# Patient Record
Sex: FEMALE | Race: WHITE | Employment: OTHER | URBAN - METROPOLITAN AREA
[De-identification: names, ages, dates, MRNs, and addresses within clinical notes are randomized per-mention and may not be internally consistent; named-entity substitution may affect disease eponyms.]

---

## 2017-01-06 ENCOUNTER — APPOINTMENT (OUTPATIENT)
Dept: CT IMAGING | Age: 82
End: 2017-01-06
Payer: MEDICARE

## 2017-01-06 ENCOUNTER — APPOINTMENT (OUTPATIENT)
Dept: GENERAL RADIOLOGY | Age: 82
End: 2017-01-06
Payer: MEDICARE

## 2017-01-06 ENCOUNTER — HOSPITAL ENCOUNTER (EMERGENCY)
Age: 82
Discharge: HOME | End: 2017-01-06
Attending: EMERGENCY MEDICINE
Payer: MEDICARE

## 2017-01-06 VITALS
SYSTOLIC BLOOD PRESSURE: 164 MMHG | RESPIRATION RATE: 16 BRPM | HEART RATE: 104 BPM | WEIGHT: 142 LBS | OXYGEN SATURATION: 99 % | HEIGHT: 61 IN | TEMPERATURE: 97.4 F | DIASTOLIC BLOOD PRESSURE: 82 MMHG | BODY MASS INDEX: 26.81 KG/M2

## 2017-01-06 DIAGNOSIS — S30.0XXA CONTUSION OF PELVIS, INITIAL ENCOUNTER: ICD-10-CM

## 2017-01-06 DIAGNOSIS — K59.09 OTHER CONSTIPATION: Primary | ICD-10-CM

## 2017-01-06 DIAGNOSIS — N30.00 ACUTE CYSTITIS WITHOUT HEMATURIA: ICD-10-CM

## 2017-01-06 LAB
ALBUMIN SERPL-MCNC: 4.2 G/DL (ref 3.9–4.9)
ALP BLD-CCNC: 106 U/L (ref 40–130)
ALT SERPL-CCNC: 12 U/L (ref 0–33)
ANION GAP SERPL CALCULATED.3IONS-SCNC: 15 MEQ/L (ref 7–13)
AST SERPL-CCNC: 18 U/L (ref 0–35)
BACTERIA: ABNORMAL /HPF
BILIRUB SERPL-MCNC: 0.2 MG/DL (ref 0–1.2)
BILIRUBIN URINE: NEGATIVE
BLOOD, URINE: NEGATIVE
BUN BLDV-MCNC: 37 MG/DL (ref 8–23)
CALCIUM SERPL-MCNC: 8.9 MG/DL (ref 8.6–10.2)
CHLORIDE BLD-SCNC: 106 MEQ/L (ref 98–107)
CLARITY: ABNORMAL
CO2: 18 MEQ/L (ref 22–29)
COLOR: YELLOW
CREAT SERPL-MCNC: 1.13 MG/DL (ref 0.5–0.9)
CRYSTALS, UA: ABNORMAL
EKG ATRIAL RATE: 107 BPM
EKG Q-T INTERVAL: 368 MS
EKG QRS DURATION: 114 MS
EKG QTC CALCULATION (BAZETT): 486 MS
EKG R AXIS: -25 DEGREES
EKG T AXIS: 0 DEGREES
EKG VENTRICULAR RATE: 105 BPM
EPITHELIAL CELLS, UA: ABNORMAL /HPF
GFR AFRICAN AMERICAN: 54
GFR NON-AFRICAN AMERICAN: 44.6
GLOBULIN: 2.6 G/DL (ref 2.3–3.5)
GLUCOSE BLD-MCNC: 125 MG/DL (ref 74–109)
GLUCOSE URINE: NEGATIVE MG/DL
HCT VFR BLD CALC: 38 % (ref 37–47)
HEMOGLOBIN: 12.4 G/DL (ref 12–16)
KETONES, URINE: NEGATIVE MG/DL
LACTIC ACID: 2.1 MMOL/L (ref 0.5–2.2)
LEUKOCYTE ESTERASE, URINE: ABNORMAL
LIPASE: 49 U/L (ref 13–60)
MCH RBC QN AUTO: 30.5 PG (ref 27–31.3)
MCHC RBC AUTO-ENTMCNC: 32.7 % (ref 33–37)
MCV RBC AUTO: 93.4 FL (ref 82–100)
NITRITE, URINE: POSITIVE
PDW BLD-RTO: 15 % (ref 11.5–14.5)
PH UA: 5.5 (ref 5–9)
PLATELET # BLD: 146 K/UL (ref 130–400)
POTASSIUM SERPL-SCNC: 4.7 MEQ/L (ref 3.5–5.1)
PROTEIN UA: 30 MG/DL
RBC # BLD: 4.07 M/UL (ref 4.2–5.4)
RBC UA: ABNORMAL /HPF (ref 0–2)
SODIUM BLD-SCNC: 139 MEQ/L (ref 132–144)
SPECIFIC GRAVITY UA: 1.02 (ref 1–1.03)
TOTAL PROTEIN: 6.8 G/DL (ref 6.4–8.1)
URINE REFLEX TO CULTURE: YES
UROBILINOGEN, URINE: 0.2 E.U./DL
WBC # BLD: 9 K/UL (ref 4.8–10.8)
WBC UA: ABNORMAL /HPF (ref 0–5)

## 2017-01-06 PROCEDURE — 87077 CULTURE AEROBIC IDENTIFY: CPT

## 2017-01-06 PROCEDURE — 72170 X-RAY EXAM OF PELVIS: CPT

## 2017-01-06 PROCEDURE — 36415 COLL VENOUS BLD VENIPUNCTURE: CPT

## 2017-01-06 PROCEDURE — 85027 COMPLETE CBC AUTOMATED: CPT

## 2017-01-06 PROCEDURE — 99285 EMERGENCY DEPT VISIT HI MDM: CPT

## 2017-01-06 PROCEDURE — 96365 THER/PROPH/DIAG IV INF INIT: CPT

## 2017-01-06 PROCEDURE — 74176 CT ABD & PELVIS W/O CONTRAST: CPT

## 2017-01-06 PROCEDURE — 81001 URINALYSIS AUTO W/SCOPE: CPT

## 2017-01-06 PROCEDURE — 93005 ELECTROCARDIOGRAM TRACING: CPT

## 2017-01-06 PROCEDURE — 80053 COMPREHEN METABOLIC PANEL: CPT

## 2017-01-06 PROCEDURE — 2580000003 HC RX 258: Performed by: EMERGENCY MEDICINE

## 2017-01-06 PROCEDURE — 83605 ASSAY OF LACTIC ACID: CPT

## 2017-01-06 PROCEDURE — 87086 URINE CULTURE/COLONY COUNT: CPT

## 2017-01-06 PROCEDURE — 96375 TX/PRO/DX INJ NEW DRUG ADDON: CPT

## 2017-01-06 PROCEDURE — 71010 XR CHEST PORTABLE: CPT

## 2017-01-06 PROCEDURE — 83690 ASSAY OF LIPASE: CPT

## 2017-01-06 PROCEDURE — 6370000000 HC RX 637 (ALT 250 FOR IP): Performed by: EMERGENCY MEDICINE

## 2017-01-06 PROCEDURE — 6360000002 HC RX W HCPCS: Performed by: EMERGENCY MEDICINE

## 2017-01-06 PROCEDURE — 87186 SC STD MICRODIL/AGAR DIL: CPT

## 2017-01-06 RX ORDER — 0.9 % SODIUM CHLORIDE 0.9 %
500 INTRAVENOUS SOLUTION INTRAVENOUS ONCE
Status: COMPLETED | OUTPATIENT
Start: 2017-01-06 | End: 2017-01-06

## 2017-01-06 RX ORDER — ONDANSETRON 2 MG/ML
4 INJECTION INTRAMUSCULAR; INTRAVENOUS ONCE
Status: COMPLETED | OUTPATIENT
Start: 2017-01-06 | End: 2017-01-06

## 2017-01-06 RX ORDER — CIPROFLOXACIN 500 MG/1
500 TABLET, FILM COATED ORAL 2 TIMES DAILY
Qty: 10 TABLET | Refills: 0 | Status: SHIPPED | OUTPATIENT
Start: 2017-01-06 | End: 2017-01-11

## 2017-01-06 RX ORDER — SODIUM CHLORIDE 0.9 % (FLUSH) 0.9 %
3 SYRINGE (ML) INJECTION EVERY 8 HOURS
Status: DISCONTINUED | OUTPATIENT
Start: 2017-01-06 | End: 2017-01-06 | Stop reason: HOSPADM

## 2017-01-06 RX ADMIN — HYDROMORPHONE HYDROCHLORIDE 0.5 MG: 1 INJECTION, SOLUTION INTRAMUSCULAR; INTRAVENOUS; SUBCUTANEOUS at 03:42

## 2017-01-06 RX ADMIN — MAGESIUM CITRATE 148 ML: 1.75 LIQUID ORAL at 01:48

## 2017-01-06 RX ADMIN — CEFTRIAXONE SODIUM 1 G: 1 INJECTION, POWDER, FOR SOLUTION INTRAMUSCULAR; INTRAVENOUS at 03:47

## 2017-01-06 RX ADMIN — ONDANSETRON 4 MG: 2 INJECTION INTRAMUSCULAR; INTRAVENOUS at 03:42

## 2017-01-06 RX ADMIN — SODIUM CHLORIDE 500 ML: 9 INJECTION, SOLUTION INTRAVENOUS at 03:45

## 2017-01-06 ASSESSMENT — PAIN DESCRIPTION - PAIN TYPE: TYPE: ACUTE PAIN

## 2017-01-06 ASSESSMENT — PAIN DESCRIPTION - LOCATION: LOCATION: ABDOMEN

## 2017-01-06 ASSESSMENT — PAIN SCALES - GENERAL
PAINLEVEL_OUTOF10: 10
PAINLEVEL_OUTOF10: 4
PAINLEVEL_OUTOF10: 4

## 2017-01-08 LAB
ORGANISM: ABNORMAL
URINE CULTURE, ROUTINE: ABNORMAL

## 2017-02-01 ENCOUNTER — OFFICE VISIT (OUTPATIENT)
Dept: INTERNAL MEDICINE | Age: 82
End: 2017-02-01

## 2017-02-01 VITALS
DIASTOLIC BLOOD PRESSURE: 82 MMHG | HEIGHT: 61 IN | BODY MASS INDEX: 27.75 KG/M2 | WEIGHT: 147 LBS | RESPIRATION RATE: 16 BRPM | SYSTOLIC BLOOD PRESSURE: 137 MMHG | OXYGEN SATURATION: 98 % | HEART RATE: 88 BPM | TEMPERATURE: 98.1 F

## 2017-02-01 DIAGNOSIS — R54 FRAIL ELDERLY: ICD-10-CM

## 2017-02-01 DIAGNOSIS — I51.7 MILD CARDIOMEGALY: ICD-10-CM

## 2017-02-01 DIAGNOSIS — E03.9 PRIMARY HYPOTHYROIDISM: ICD-10-CM

## 2017-02-01 DIAGNOSIS — Z87.440 HISTORY OF UTI: ICD-10-CM

## 2017-02-01 DIAGNOSIS — R52 BODY ACHES: ICD-10-CM

## 2017-02-01 DIAGNOSIS — R41.0 INTERMITTENT CONFUSION: ICD-10-CM

## 2017-02-01 DIAGNOSIS — F41.0 PANIC ATTACKS: ICD-10-CM

## 2017-02-01 DIAGNOSIS — I10 ESSENTIAL HYPERTENSION, BENIGN: ICD-10-CM

## 2017-02-01 DIAGNOSIS — F41.9 ANXIETY: ICD-10-CM

## 2017-02-01 DIAGNOSIS — F41.9 ANXIETY: Primary | ICD-10-CM

## 2017-02-01 DIAGNOSIS — J90 PLEURAL EFFUSION, LEFT: ICD-10-CM

## 2017-02-01 LAB
ALBUMIN SERPL-MCNC: 3.7 G/DL (ref 3.9–4.9)
ALP BLD-CCNC: 111 U/L (ref 40–130)
ALT SERPL-CCNC: 18 U/L (ref 0–33)
ANION GAP SERPL CALCULATED.3IONS-SCNC: 14 MEQ/L (ref 7–13)
AST SERPL-CCNC: 20 U/L (ref 0–35)
BILIRUB SERPL-MCNC: 0.2 MG/DL (ref 0–1.2)
BUN BLDV-MCNC: 28 MG/DL (ref 8–23)
CALCIUM SERPL-MCNC: 9 MG/DL (ref 8.6–10.2)
CHLORIDE BLD-SCNC: 104 MEQ/L (ref 98–107)
CO2: 23 MEQ/L (ref 22–29)
CREAT SERPL-MCNC: 1.17 MG/DL (ref 0.5–0.9)
GFR AFRICAN AMERICAN: 51.9
GFR NON-AFRICAN AMERICAN: 42.9
GLOBULIN: 2.4 G/DL (ref 2.3–3.5)
GLUCOSE BLD-MCNC: 98 MG/DL (ref 74–109)
HCT VFR BLD CALC: 38.6 % (ref 37–47)
HEMOGLOBIN: 12.3 G/DL (ref 12–16)
MCH RBC QN AUTO: 29.8 PG (ref 27–31.3)
MCHC RBC AUTO-ENTMCNC: 31.8 % (ref 33–37)
MCV RBC AUTO: 93.6 FL (ref 82–100)
PDW BLD-RTO: 15.2 % (ref 11.5–14.5)
PLATELET # BLD: 195 K/UL (ref 130–400)
POTASSIUM SERPL-SCNC: 4.3 MEQ/L (ref 3.5–5.1)
RBC # BLD: 4.13 M/UL (ref 4.2–5.4)
SODIUM BLD-SCNC: 141 MEQ/L (ref 132–144)
TOTAL PROTEIN: 6.1 G/DL (ref 6.4–8.1)
TSH SERPL DL<=0.05 MIU/L-ACNC: 4.91 UIU/ML (ref 0.27–4.2)
WBC # BLD: 4.8 K/UL (ref 4.8–10.8)

## 2017-02-01 PROCEDURE — 99214 OFFICE O/P EST MOD 30 MIN: CPT | Performed by: INTERNAL MEDICINE

## 2017-02-01 RX ORDER — PAROXETINE 10 MG/1
10 TABLET, FILM COATED ORAL DAILY
Qty: 30 TABLET | Refills: 0 | Status: SHIPPED | OUTPATIENT
Start: 2017-02-01 | End: 2017-02-21 | Stop reason: SDUPTHER

## 2017-02-02 DIAGNOSIS — Z87.440 HISTORY OF UTI: ICD-10-CM

## 2017-02-02 LAB
BACTERIA: NORMAL /HPF
BILIRUBIN URINE: NEGATIVE
BLOOD, URINE: NEGATIVE
CLARITY: ABNORMAL
COLOR: YELLOW
CRYSTALS, UA: NORMAL
EPITHELIAL CELLS, UA: NORMAL /HPF
GLUCOSE URINE: NEGATIVE MG/DL
KETONES, URINE: NEGATIVE MG/DL
LEUKOCYTE ESTERASE, URINE: ABNORMAL
NITRITE, URINE: NEGATIVE
PH UA: 7 (ref 5–9)
PROTEIN UA: NEGATIVE MG/DL
RBC UA: NORMAL /HPF (ref 0–2)
SPECIFIC GRAVITY UA: 1.02 (ref 1–1.03)
URINE REFLEX TO CULTURE: YES
UROBILINOGEN, URINE: 0.2 E.U./DL
WBC UA: NORMAL /HPF (ref 0–5)

## 2017-02-04 LAB
ORGANISM: ABNORMAL
URINE CULTURE, ROUTINE: ABNORMAL

## 2017-02-13 RX ORDER — LEVOTHYROXINE SODIUM 0.15 MG/1
TABLET ORAL
Qty: 30 TABLET | Refills: 0 | Status: SHIPPED | OUTPATIENT
Start: 2017-02-13 | End: 2017-03-15 | Stop reason: SDUPTHER

## 2017-02-14 ENCOUNTER — OFFICE VISIT (OUTPATIENT)
Dept: INTERNAL MEDICINE | Age: 82
End: 2017-02-14

## 2017-02-14 VITALS
DIASTOLIC BLOOD PRESSURE: 75 MMHG | BODY MASS INDEX: 26.24 KG/M2 | SYSTOLIC BLOOD PRESSURE: 116 MMHG | OXYGEN SATURATION: 97 % | HEART RATE: 87 BPM | RESPIRATION RATE: 16 BRPM | TEMPERATURE: 99 F | WEIGHT: 139 LBS | HEIGHT: 61 IN

## 2017-02-14 DIAGNOSIS — N18.30 CKD (CHRONIC KIDNEY DISEASE) STAGE 3, GFR 30-59 ML/MIN (HCC): ICD-10-CM

## 2017-02-14 DIAGNOSIS — F41.9 ANXIETY: Primary | ICD-10-CM

## 2017-02-14 DIAGNOSIS — E88.09 PROTEINS SERUM PLASMA LOW: ICD-10-CM

## 2017-02-14 DIAGNOSIS — E03.9 PRIMARY HYPOTHYROIDISM: ICD-10-CM

## 2017-02-14 DIAGNOSIS — I10 ESSENTIAL HYPERTENSION, BENIGN: ICD-10-CM

## 2017-02-14 PROCEDURE — 99213 OFFICE O/P EST LOW 20 MIN: CPT | Performed by: INTERNAL MEDICINE

## 2017-02-14 RX ORDER — TORSEMIDE 5 MG/1
5 TABLET ORAL DAILY
Qty: 30 TABLET | Refills: 1 | Status: CANCELLED | OUTPATIENT
Start: 2017-02-14

## 2017-02-14 RX ORDER — DILTIAZEM HYDROCHLORIDE 240 MG/1
240 CAPSULE, COATED, EXTENDED RELEASE ORAL 2 TIMES DAILY
Qty: 60 CAPSULE | Refills: 1 | Status: SHIPPED | OUTPATIENT
Start: 2017-02-14 | End: 2017-08-07 | Stop reason: SDUPTHER

## 2017-02-14 RX ORDER — HYDRALAZINE HYDROCHLORIDE 25 MG/1
25 TABLET, FILM COATED ORAL 2 TIMES DAILY
Qty: 60 TABLET | Refills: 1 | Status: ON HOLD | OUTPATIENT
Start: 2017-02-14 | End: 2017-08-04 | Stop reason: HOSPADM

## 2017-02-14 RX ORDER — LOSARTAN POTASSIUM 100 MG/1
TABLET ORAL
Qty: 30 TABLET | Refills: 1 | Status: ON HOLD | OUTPATIENT
Start: 2017-02-14 | End: 2017-08-04 | Stop reason: HOSPADM

## 2017-02-17 ASSESSMENT — ENCOUNTER SYMPTOMS
VOICE CHANGE: 0
APNEA: 0
ABDOMINAL PAIN: 0
BLOOD IN STOOL: 0
VOMITING: 0
CHOKING: 0
BACK PAIN: 0
SHORTNESS OF BREATH: 0
DIARRHEA: 0
CONSTIPATION: 0
PHOTOPHOBIA: 0
COLOR CHANGE: 0
ABDOMINAL DISTENTION: 0
SORE THROAT: 0
CHEST TIGHTNESS: 0
EYE PAIN: 0
COUGH: 0
WHEEZING: 0
NAUSEA: 0
TROUBLE SWALLOWING: 0

## 2017-02-21 DIAGNOSIS — F41.9 ANXIETY: ICD-10-CM

## 2017-02-21 DIAGNOSIS — F41.0 PANIC ATTACKS: ICD-10-CM

## 2017-02-21 RX ORDER — PAROXETINE 10 MG/1
TABLET, FILM COATED ORAL
Qty: 45 TABLET | Refills: 0 | Status: SHIPPED | OUTPATIENT
Start: 2017-02-21 | End: 2017-03-24 | Stop reason: SDUPTHER

## 2017-02-27 ASSESSMENT — ENCOUNTER SYMPTOMS
BACK PAIN: 0
DIARRHEA: 0
NAUSEA: 0
COUGH: 0
SHORTNESS OF BREATH: 0
VOMITING: 0
WHEEZING: 0
CONSTIPATION: 0
ABDOMINAL DISTENTION: 0

## 2017-03-15 RX ORDER — LEVOTHYROXINE SODIUM 0.15 MG/1
TABLET ORAL
Qty: 30 TABLET | Refills: 0 | Status: SHIPPED | OUTPATIENT
Start: 2017-03-15 | End: 2017-08-07 | Stop reason: SDUPTHER

## 2017-03-24 DIAGNOSIS — F41.9 ANXIETY: ICD-10-CM

## 2017-03-24 DIAGNOSIS — F41.0 PANIC ATTACKS: ICD-10-CM

## 2017-03-24 RX ORDER — PAROXETINE 10 MG/1
TABLET, FILM COATED ORAL
Qty: 45 TABLET | Refills: 0 | Status: SHIPPED | OUTPATIENT
Start: 2017-03-24 | End: 2017-08-07 | Stop reason: SDUPTHER

## 2017-06-26 ENCOUNTER — TELEPHONE (OUTPATIENT)
Dept: PHARMACY | Facility: CLINIC | Age: 82
End: 2017-06-26

## 2017-08-01 ENCOUNTER — APPOINTMENT (OUTPATIENT)
Dept: INTERVENTIONAL RADIOLOGY/VASCULAR | Age: 82
DRG: 871 | End: 2017-08-01
Payer: MEDICARE

## 2017-08-01 ENCOUNTER — APPOINTMENT (OUTPATIENT)
Dept: GENERAL RADIOLOGY | Age: 82
DRG: 871 | End: 2017-08-01
Payer: MEDICARE

## 2017-08-01 ENCOUNTER — HOSPITAL ENCOUNTER (INPATIENT)
Age: 82
LOS: 4 days | Discharge: NURSING, REHAB OR LONG-TERM CARE | DRG: 871 | End: 2017-08-05
Attending: EMERGENCY MEDICINE | Admitting: FAMILY MEDICINE
Payer: MEDICARE

## 2017-08-01 DIAGNOSIS — R65.20 SEVERE SEPSIS (HCC): ICD-10-CM

## 2017-08-01 DIAGNOSIS — R09.02 HYPOXIA: ICD-10-CM

## 2017-08-01 DIAGNOSIS — J18.9 COMMUNITY ACQUIRED PNEUMONIA: Primary | ICD-10-CM

## 2017-08-01 DIAGNOSIS — A41.9 SEVERE SEPSIS (HCC): ICD-10-CM

## 2017-08-01 LAB
ANION GAP SERPL CALCULATED.3IONS-SCNC: 17 MEQ/L (ref 7–13)
BACTERIA: ABNORMAL /HPF
BASE EXCESS VENOUS: -3 (ref -3–3)
BASOPHILS ABSOLUTE: 0 K/UL (ref 0–0.2)
BASOPHILS RELATIVE PERCENT: 0.3 %
BILIRUBIN URINE: NEGATIVE
BLOOD, URINE: NEGATIVE
BUN BLDV-MCNC: 17 MG/DL (ref 8–23)
CALCIUM SERPL-MCNC: 8.5 MG/DL (ref 8.6–10.2)
CHLORIDE BLD-SCNC: 104 MEQ/L (ref 98–107)
CLARITY: ABNORMAL
CO2: 21 MEQ/L (ref 22–29)
COLOR: YELLOW
CREAT SERPL-MCNC: 0.89 MG/DL (ref 0.5–0.9)
EOSINOPHILS ABSOLUTE: 0.1 K/UL (ref 0–0.7)
EOSINOPHILS RELATIVE PERCENT: 0.8 %
EPITHELIAL CELLS, UA: ABNORMAL /HPF
GFR AFRICAN AMERICAN: >60
GFR NON-AFRICAN AMERICAN: 58.7
GLUCOSE BLD-MCNC: 229 MG/DL (ref 74–109)
GLUCOSE URINE: NEGATIVE MG/DL
HCO3 VENOUS: 21.9 MMOL/L (ref 23–29)
HCT VFR BLD CALC: 36.3 % (ref 37–47)
HEMOGLOBIN: 11.6 G/DL (ref 12–16)
KETONES, URINE: ABNORMAL MG/DL
LACTATE: 3.39 MMOL/L (ref 0.4–2)
LACTIC ACID: 2.9 MMOL/L (ref 0.5–2.2)
LACTIC ACID: 3 MMOL/L (ref 0.5–2.2)
LEUKOCYTE ESTERASE, URINE: ABNORMAL
LYMPHOCYTES ABSOLUTE: 1.1 K/UL (ref 1–4.8)
LYMPHOCYTES RELATIVE PERCENT: 8.5 %
MCH RBC QN AUTO: 28.2 PG (ref 27–31.3)
MCHC RBC AUTO-ENTMCNC: 31.9 % (ref 33–37)
MCV RBC AUTO: 88.5 FL (ref 82–100)
MONOCYTES ABSOLUTE: 0.4 K/UL (ref 0.2–0.8)
MONOCYTES RELATIVE PERCENT: 3.4 %
NEUTROPHILS ABSOLUTE: 11.5 K/UL (ref 1.4–6.5)
NEUTROPHILS RELATIVE PERCENT: 87 %
NITRITE, URINE: NEGATIVE
O2 SAT, VEN: 62 %
PCO2, VEN: 35 MM HG (ref 40–50)
PDW BLD-RTO: 15.3 % (ref 11.5–14.5)
PERFORMED ON: ABNORMAL
PH UA: 5.5 (ref 5–9)
PH VENOUS: 7.41 (ref 7.35–7.45)
PLATELET # BLD: 200 K/UL (ref 130–400)
PO2, VEN: 32 MM HG
POC SAMPLE TYPE: ABNORMAL
POTASSIUM SERPL-SCNC: 3.3 MEQ/L (ref 3.5–5.1)
PROTEIN UA: 30 MG/DL
RBC # BLD: 4.1 M/UL (ref 4.2–5.4)
RBC UA: ABNORMAL /HPF (ref 0–2)
SODIUM BLD-SCNC: 142 MEQ/L (ref 132–144)
SPECIFIC GRAVITY UA: 1.02 (ref 1–1.03)
TCO2 CALC VENOUS: 23 MMOL/L
TROPONIN: 0.05 NG/ML (ref 0–0.01)
URINE REFLEX TO CULTURE: YES
UROBILINOGEN, URINE: 1 E.U./DL
WBC # BLD: 13.2 K/UL (ref 4.8–10.8)
WBC UA: ABNORMAL /HPF (ref 0–5)

## 2017-08-01 PROCEDURE — 2000000000 HC ICU R&B

## 2017-08-01 PROCEDURE — 99291 CRITICAL CARE FIRST HOUR: CPT | Performed by: ANESTHESIOLOGY

## 2017-08-01 PROCEDURE — 2500000003 HC RX 250 WO HCPCS: Performed by: ANESTHESIOLOGY

## 2017-08-01 PROCEDURE — 85025 COMPLETE CBC W/AUTO DIFF WBC: CPT

## 2017-08-01 PROCEDURE — 2580000003 HC RX 258: Performed by: EMERGENCY MEDICINE

## 2017-08-01 PROCEDURE — 93005 ELECTROCARDIOGRAM TRACING: CPT

## 2017-08-01 PROCEDURE — 36600 WITHDRAWAL OF ARTERIAL BLOOD: CPT

## 2017-08-01 PROCEDURE — 83605 ASSAY OF LACTIC ACID: CPT

## 2017-08-01 PROCEDURE — 87077 CULTURE AEROBIC IDENTIFY: CPT

## 2017-08-01 PROCEDURE — 77001 FLUOROGUIDE FOR VEIN DEVICE: CPT | Performed by: RADIOLOGY

## 2017-08-01 PROCEDURE — 81001 URINALYSIS AUTO W/SCOPE: CPT

## 2017-08-01 PROCEDURE — 6360000002 HC RX W HCPCS: Performed by: ANESTHESIOLOGY

## 2017-08-01 PROCEDURE — 36569 INSJ PICC 5 YR+ W/O IMAGING: CPT | Performed by: RADIOLOGY

## 2017-08-01 PROCEDURE — 87186 SC STD MICRODIL/AGAR DIL: CPT

## 2017-08-01 PROCEDURE — 6360000002 HC RX W HCPCS: Performed by: FAMILY MEDICINE

## 2017-08-01 PROCEDURE — 87040 BLOOD CULTURE FOR BACTERIA: CPT

## 2017-08-01 PROCEDURE — C1751 CATH, INF, PER/CENT/MIDLINE: HCPCS

## 2017-08-01 PROCEDURE — 96374 THER/PROPH/DIAG INJ IV PUSH: CPT

## 2017-08-01 PROCEDURE — 6360000002 HC RX W HCPCS: Performed by: EMERGENCY MEDICINE

## 2017-08-01 PROCEDURE — 02HV33Z INSERTION OF INFUSION DEVICE INTO SUPERIOR VENA CAVA, PERCUTANEOUS APPROACH: ICD-10-PCS | Performed by: ANESTHESIOLOGY

## 2017-08-01 PROCEDURE — 87086 URINE CULTURE/COLONY COUNT: CPT

## 2017-08-01 PROCEDURE — 80048 BASIC METABOLIC PNL TOTAL CA: CPT

## 2017-08-01 PROCEDURE — 2580000003 HC RX 258: Performed by: ANESTHESIOLOGY

## 2017-08-01 PROCEDURE — 71010 XR CHEST PORTABLE: CPT

## 2017-08-01 PROCEDURE — 82803 BLOOD GASES ANY COMBINATION: CPT

## 2017-08-01 PROCEDURE — 99285 EMERGENCY DEPT VISIT HI MDM: CPT

## 2017-08-01 PROCEDURE — 76937 US GUIDE VASCULAR ACCESS: CPT | Performed by: RADIOLOGY

## 2017-08-01 PROCEDURE — 84484 ASSAY OF TROPONIN QUANT: CPT

## 2017-08-01 PROCEDURE — 36415 COLL VENOUS BLD VENIPUNCTURE: CPT

## 2017-08-01 RX ORDER — SODIUM CHLORIDE 9 MG/ML
INJECTION, SOLUTION INTRAVENOUS CONTINUOUS
Status: DISCONTINUED | OUTPATIENT
Start: 2017-08-01 | End: 2017-08-03

## 2017-08-01 RX ORDER — SODIUM CHLORIDE 9 MG/ML
INJECTION, SOLUTION INTRAVENOUS
Status: DISCONTINUED
Start: 2017-08-01 | End: 2017-08-02

## 2017-08-01 RX ORDER — ACETAMINOPHEN 325 MG/1
650 TABLET ORAL EVERY 4 HOURS PRN
Status: DISCONTINUED | OUTPATIENT
Start: 2017-08-01 | End: 2017-08-05 | Stop reason: HOSPADM

## 2017-08-01 RX ORDER — SODIUM CHLORIDE 0.9 % (FLUSH) 0.9 %
10 SYRINGE (ML) INJECTION PRN
Status: DISCONTINUED | OUTPATIENT
Start: 2017-08-01 | End: 2017-08-05 | Stop reason: HOSPADM

## 2017-08-01 RX ORDER — SODIUM CHLORIDE 0.9 % (FLUSH) 0.9 %
10 SYRINGE (ML) INJECTION PRN
Status: DISCONTINUED | OUTPATIENT
Start: 2017-08-01 | End: 2017-08-01 | Stop reason: SDUPTHER

## 2017-08-01 RX ORDER — 0.9 % SODIUM CHLORIDE 0.9 %
1000 INTRAVENOUS SOLUTION INTRAVENOUS ONCE
Status: COMPLETED | OUTPATIENT
Start: 2017-08-01 | End: 2017-08-01

## 2017-08-01 RX ORDER — SODIUM CHLORIDE 0.9 % (FLUSH) 0.9 %
10 SYRINGE (ML) INJECTION EVERY 12 HOURS SCHEDULED
Status: DISCONTINUED | OUTPATIENT
Start: 2017-08-01 | End: 2017-08-05 | Stop reason: HOSPADM

## 2017-08-01 RX ORDER — 0.9 % SODIUM CHLORIDE 0.9 %
30 INTRAVENOUS SOLUTION INTRAVENOUS ONCE
Status: COMPLETED | OUTPATIENT
Start: 2017-08-01 | End: 2017-08-01

## 2017-08-01 RX ORDER — SODIUM CHLORIDE 9 MG/ML
INJECTION, SOLUTION INTRAVENOUS CONTINUOUS
Status: DISCONTINUED | OUTPATIENT
Start: 2017-08-01 | End: 2017-08-01

## 2017-08-01 RX ORDER — 0.9 % SODIUM CHLORIDE 0.9 %
500 INTRAVENOUS SOLUTION INTRAVENOUS
Status: ACTIVE | OUTPATIENT
Start: 2017-08-01 | End: 2017-08-01

## 2017-08-01 RX ORDER — SODIUM CHLORIDE 0.9 % (FLUSH) 0.9 %
10 SYRINGE (ML) INJECTION EVERY 12 HOURS SCHEDULED
Status: DISCONTINUED | OUTPATIENT
Start: 2017-08-01 | End: 2017-08-01 | Stop reason: SDUPTHER

## 2017-08-01 RX ORDER — PANTOPRAZOLE SODIUM 40 MG/1
40 TABLET, DELAYED RELEASE ORAL
Status: DISCONTINUED | OUTPATIENT
Start: 2017-08-02 | End: 2017-08-05 | Stop reason: HOSPADM

## 2017-08-01 RX ORDER — ONDANSETRON 2 MG/ML
4 INJECTION INTRAMUSCULAR; INTRAVENOUS ONCE
Status: COMPLETED | OUTPATIENT
Start: 2017-08-01 | End: 2017-08-01

## 2017-08-01 RX ORDER — LEVOTHYROXINE SODIUM 0.15 MG/1
150 TABLET ORAL DAILY
Status: DISCONTINUED | OUTPATIENT
Start: 2017-08-01 | End: 2017-08-05 | Stop reason: HOSPADM

## 2017-08-01 RX ORDER — ONDANSETRON 2 MG/ML
4 INJECTION INTRAMUSCULAR; INTRAVENOUS EVERY 6 HOURS PRN
Status: DISCONTINUED | OUTPATIENT
Start: 2017-08-01 | End: 2017-08-05 | Stop reason: HOSPADM

## 2017-08-01 RX ORDER — LIDOCAINE HYDROCHLORIDE 20 MG/ML
5 INJECTION, SOLUTION INFILTRATION; PERINEURAL ONCE
Status: COMPLETED | OUTPATIENT
Start: 2017-08-01 | End: 2017-08-01

## 2017-08-01 RX ORDER — ONDANSETRON 2 MG/ML
4 INJECTION INTRAMUSCULAR; INTRAVENOUS EVERY 6 HOURS PRN
Status: DISCONTINUED | OUTPATIENT
Start: 2017-08-01 | End: 2017-08-01

## 2017-08-01 RX ORDER — SODIUM CHLORIDE 9 MG/ML
250 INJECTION, SOLUTION INTRAVENOUS ONCE
Status: COMPLETED | OUTPATIENT
Start: 2017-08-01 | End: 2017-08-01

## 2017-08-01 RX ADMIN — PIPERACILLIN SODIUM,TAZOBACTAM SODIUM 3.38 G: 3; .375 INJECTION, POWDER, FOR SOLUTION INTRAVENOUS at 17:44

## 2017-08-01 RX ADMIN — CEFTRIAXONE SODIUM 1 G: 1 INJECTION, POWDER, FOR SOLUTION INTRAMUSCULAR; INTRAVENOUS at 11:51

## 2017-08-01 RX ADMIN — AZITHROMYCIN MONOHYDRATE 500 MG: 500 INJECTION, POWDER, LYOPHILIZED, FOR SOLUTION INTRAVENOUS at 12:20

## 2017-08-01 RX ADMIN — ONDANSETRON 4 MG: 2 INJECTION INTRAMUSCULAR; INTRAVENOUS at 11:10

## 2017-08-01 RX ADMIN — SODIUM CHLORIDE 250 ML: 9 INJECTION, SOLUTION INTRAVENOUS at 16:52

## 2017-08-01 RX ADMIN — SODIUM CHLORIDE 1000 ML: 9 INJECTION, SOLUTION INTRAVENOUS at 13:59

## 2017-08-01 RX ADMIN — SODIUM CHLORIDE 1893 ML: 9 INJECTION, SOLUTION INTRAVENOUS at 11:58

## 2017-08-01 RX ADMIN — HYDROCORTISONE SODIUM SUCCINATE 50 MG: 100 INJECTION, POWDER, FOR SOLUTION INTRAMUSCULAR; INTRAVENOUS at 15:00

## 2017-08-01 RX ADMIN — Medication 10 ML: at 10:30

## 2017-08-01 RX ADMIN — Medication 5 MCG/MIN: at 15:42

## 2017-08-01 RX ADMIN — SODIUM CHLORIDE, PRESERVATIVE FREE 10 ML: 5 INJECTION INTRAVENOUS at 22:40

## 2017-08-01 RX ADMIN — SODIUM CHLORIDE, PRESERVATIVE FREE 10 ML: 5 INJECTION INTRAVENOUS at 22:39

## 2017-08-01 RX ADMIN — ENOXAPARIN SODIUM 40 MG: 40 INJECTION SUBCUTANEOUS at 17:44

## 2017-08-01 RX ADMIN — LIDOCAINE HYDROCHLORIDE 5 ML: 20 INJECTION, SOLUTION INFILTRATION; PERINEURAL at 16:30

## 2017-08-01 ASSESSMENT — PAIN SCALES - GENERAL
PAINLEVEL_OUTOF10: 0

## 2017-08-02 LAB
ALBUMIN SERPL-MCNC: 2.8 G/DL (ref 3.9–4.9)
ALP BLD-CCNC: 82 U/L (ref 40–130)
ALT SERPL-CCNC: 8 U/L (ref 0–33)
ANION GAP SERPL CALCULATED.3IONS-SCNC: 14 MEQ/L (ref 7–13)
AST SERPL-CCNC: 17 U/L (ref 0–35)
BILIRUB SERPL-MCNC: 0.2 MG/DL (ref 0–1.2)
BUN BLDV-MCNC: 19 MG/DL (ref 8–23)
CALCIUM SERPL-MCNC: 8.1 MG/DL (ref 8.6–10.2)
CHLORIDE BLD-SCNC: 106 MEQ/L (ref 98–107)
CO2: 21 MEQ/L (ref 22–29)
CREAT SERPL-MCNC: 0.91 MG/DL (ref 0.5–0.9)
GFR AFRICAN AMERICAN: >60
GFR NON-AFRICAN AMERICAN: 57.2
GLOBULIN: 2.5 G/DL (ref 2.3–3.5)
GLUCOSE BLD-MCNC: 119 MG/DL (ref 74–109)
HCT VFR BLD CALC: 31.7 % (ref 37–47)
HEMOGLOBIN: 10.1 G/DL (ref 12–16)
LACTIC ACID: 1.4 MMOL/L (ref 0.5–2.2)
MAGNESIUM: 2.1 MG/DL (ref 1.7–2.3)
MCH RBC QN AUTO: 28.6 PG (ref 27–31.3)
MCHC RBC AUTO-ENTMCNC: 31.7 % (ref 33–37)
MCV RBC AUTO: 90 FL (ref 82–100)
PDW BLD-RTO: 15.3 % (ref 11.5–14.5)
PHOSPHORUS: 3.9 MG/DL (ref 2.5–4.5)
PLATELET # BLD: 144 K/UL (ref 130–400)
POTASSIUM SERPL-SCNC: 3.9 MEQ/L (ref 3.5–5.1)
RBC # BLD: 3.52 M/UL (ref 4.2–5.4)
SODIUM BLD-SCNC: 141 MEQ/L (ref 132–144)
TOTAL PROTEIN: 5.3 G/DL (ref 6.4–8.1)
WBC # BLD: 6.3 K/UL (ref 4.8–10.8)

## 2017-08-02 PROCEDURE — 2700000000 HC OXYGEN THERAPY PER DAY

## 2017-08-02 PROCEDURE — 36415 COLL VENOUS BLD VENIPUNCTURE: CPT

## 2017-08-02 PROCEDURE — 99231 SBSQ HOSP IP/OBS SF/LOW 25: CPT | Performed by: ANESTHESIOLOGY

## 2017-08-02 PROCEDURE — G8978 MOBILITY CURRENT STATUS: HCPCS

## 2017-08-02 PROCEDURE — 83735 ASSAY OF MAGNESIUM: CPT

## 2017-08-02 PROCEDURE — 83605 ASSAY OF LACTIC ACID: CPT

## 2017-08-02 PROCEDURE — G8987 SELF CARE CURRENT STATUS: HCPCS

## 2017-08-02 PROCEDURE — 85027 COMPLETE CBC AUTOMATED: CPT

## 2017-08-02 PROCEDURE — 2580000003 HC RX 258: Performed by: ANESTHESIOLOGY

## 2017-08-02 PROCEDURE — 36591 DRAW BLOOD OFF VENOUS DEVICE: CPT | Performed by: NURSE PRACTITIONER

## 2017-08-02 PROCEDURE — G8979 MOBILITY GOAL STATUS: HCPCS

## 2017-08-02 PROCEDURE — 6370000000 HC RX 637 (ALT 250 FOR IP): Performed by: INTERNAL MEDICINE

## 2017-08-02 PROCEDURE — 6370000000 HC RX 637 (ALT 250 FOR IP): Performed by: ANESTHESIOLOGY

## 2017-08-02 PROCEDURE — 80053 COMPREHEN METABOLIC PANEL: CPT

## 2017-08-02 PROCEDURE — 97167 OT EVAL HIGH COMPLEX 60 MIN: CPT

## 2017-08-02 PROCEDURE — 84100 ASSAY OF PHOSPHORUS: CPT

## 2017-08-02 PROCEDURE — G8988 SELF CARE GOAL STATUS: HCPCS

## 2017-08-02 PROCEDURE — 6360000002 HC RX W HCPCS: Performed by: FAMILY MEDICINE

## 2017-08-02 PROCEDURE — 2580000003 HC RX 258: Performed by: FAMILY MEDICINE

## 2017-08-02 PROCEDURE — 6360000002 HC RX W HCPCS: Performed by: ANESTHESIOLOGY

## 2017-08-02 PROCEDURE — 1210000000 HC MED SURG R&B

## 2017-08-02 PROCEDURE — 97162 PT EVAL MOD COMPLEX 30 MIN: CPT

## 2017-08-02 RX ORDER — LOSARTAN POTASSIUM 50 MG/1
100 TABLET ORAL DAILY
Status: DISCONTINUED | OUTPATIENT
Start: 2017-08-03 | End: 2017-08-04

## 2017-08-02 RX ORDER — HYDRALAZINE HYDROCHLORIDE 25 MG/1
25 TABLET, FILM COATED ORAL 2 TIMES DAILY
Status: DISCONTINUED | OUTPATIENT
Start: 2017-08-02 | End: 2017-08-05 | Stop reason: HOSPADM

## 2017-08-02 RX ORDER — PAROXETINE 10 MG/1
15 TABLET, FILM COATED ORAL DAILY
Status: DISCONTINUED | OUTPATIENT
Start: 2017-08-02 | End: 2017-08-05 | Stop reason: HOSPADM

## 2017-08-02 RX ORDER — DILTIAZEM HYDROCHLORIDE 240 MG/1
240 CAPSULE, COATED, EXTENDED RELEASE ORAL 2 TIMES DAILY
Status: DISCONTINUED | OUTPATIENT
Start: 2017-08-02 | End: 2017-08-05 | Stop reason: HOSPADM

## 2017-08-02 RX ADMIN — PIPERACILLIN SODIUM,TAZOBACTAM SODIUM 3.38 G: 3; .375 INJECTION, POWDER, FOR SOLUTION INTRAVENOUS at 08:52

## 2017-08-02 RX ADMIN — SODIUM CHLORIDE, PRESERVATIVE FREE 10 ML: 5 INJECTION INTRAVENOUS at 08:52

## 2017-08-02 RX ADMIN — PANTOPRAZOLE SODIUM 40 MG: 40 TABLET, DELAYED RELEASE ORAL at 06:31

## 2017-08-02 RX ADMIN — HYDROCORTISONE SODIUM SUCCINATE 50 MG: 100 INJECTION, POWDER, FOR SOLUTION INTRAMUSCULAR; INTRAVENOUS at 17:11

## 2017-08-02 RX ADMIN — HYDROCORTISONE SODIUM SUCCINATE 50 MG: 100 INJECTION, POWDER, FOR SOLUTION INTRAMUSCULAR; INTRAVENOUS at 02:34

## 2017-08-02 RX ADMIN — AZITHROMYCIN MONOHYDRATE 500 MG: 500 INJECTION, POWDER, LYOPHILIZED, FOR SOLUTION INTRAVENOUS at 06:30

## 2017-08-02 RX ADMIN — PIPERACILLIN SODIUM,TAZOBACTAM SODIUM 3.38 G: 3; .375 INJECTION, POWDER, FOR SOLUTION INTRAVENOUS at 17:11

## 2017-08-02 RX ADMIN — PIPERACILLIN SODIUM,TAZOBACTAM SODIUM 3.38 G: 3; .375 INJECTION, POWDER, FOR SOLUTION INTRAVENOUS at 02:34

## 2017-08-02 RX ADMIN — HYDROCORTISONE SODIUM SUCCINATE 50 MG: 100 INJECTION, POWDER, FOR SOLUTION INTRAMUSCULAR; INTRAVENOUS at 08:51

## 2017-08-02 RX ADMIN — ENOXAPARIN SODIUM 40 MG: 40 INJECTION SUBCUTANEOUS at 08:51

## 2017-08-02 RX ADMIN — LEVOTHYROXINE SODIUM 150 MCG: 150 TABLET ORAL at 06:31

## 2017-08-02 RX ADMIN — DILTIAZEM HYDROCHLORIDE 240 MG: 240 CAPSULE, COATED, EXTENDED RELEASE ORAL at 23:42

## 2017-08-02 RX ADMIN — HYDRALAZINE HYDROCHLORIDE 25 MG: 25 TABLET, FILM COATED ORAL at 22:49

## 2017-08-02 RX ADMIN — PAROXETINE 15 MG: 10 TABLET, FILM COATED ORAL at 15:36

## 2017-08-02 ASSESSMENT — PAIN SCALES - GENERAL
PAINLEVEL_OUTOF10: 0

## 2017-08-03 ENCOUNTER — APPOINTMENT (OUTPATIENT)
Dept: GENERAL RADIOLOGY | Age: 82
DRG: 871 | End: 2017-08-03
Payer: MEDICARE

## 2017-08-03 LAB
ANION GAP SERPL CALCULATED.3IONS-SCNC: 12 MEQ/L (ref 7–13)
BUN BLDV-MCNC: 16 MG/DL (ref 8–23)
CALCIUM SERPL-MCNC: 7.9 MG/DL (ref 8.6–10.2)
CHLORIDE BLD-SCNC: 110 MEQ/L (ref 98–107)
CO2: 23 MEQ/L (ref 22–29)
CREAT SERPL-MCNC: 0.78 MG/DL (ref 0.5–0.9)
GFR AFRICAN AMERICAN: >60
GFR NON-AFRICAN AMERICAN: >60
GLUCOSE BLD-MCNC: 114 MG/DL (ref 74–109)
HCT VFR BLD CALC: 31.6 % (ref 37–47)
HEMOGLOBIN: 10.2 G/DL (ref 12–16)
MAGNESIUM: 2.1 MG/DL (ref 1.7–2.3)
MCH RBC QN AUTO: 28.5 PG (ref 27–31.3)
MCHC RBC AUTO-ENTMCNC: 32.2 % (ref 33–37)
MCV RBC AUTO: 88.5 FL (ref 82–100)
PDW BLD-RTO: 15.3 % (ref 11.5–14.5)
PLATELET # BLD: 132 K/UL (ref 130–400)
POTASSIUM SERPL-SCNC: 3 MEQ/L (ref 3.5–5.1)
RBC # BLD: 3.57 M/UL (ref 4.2–5.4)
SODIUM BLD-SCNC: 145 MEQ/L (ref 132–144)
WBC # BLD: 7.3 K/UL (ref 4.8–10.8)

## 2017-08-03 PROCEDURE — 2580000003 HC RX 258: Performed by: FAMILY MEDICINE

## 2017-08-03 PROCEDURE — 6370000000 HC RX 637 (ALT 250 FOR IP): Performed by: INTERNAL MEDICINE

## 2017-08-03 PROCEDURE — 83735 ASSAY OF MAGNESIUM: CPT

## 2017-08-03 PROCEDURE — 1210000000 HC MED SURG R&B

## 2017-08-03 PROCEDURE — 6360000002 HC RX W HCPCS: Performed by: ANESTHESIOLOGY

## 2017-08-03 PROCEDURE — 6360000002 HC RX W HCPCS: Performed by: INTERNAL MEDICINE

## 2017-08-03 PROCEDURE — 71020 XR CHEST STANDARD TWO VW: CPT

## 2017-08-03 PROCEDURE — 6370000000 HC RX 637 (ALT 250 FOR IP): Performed by: ANESTHESIOLOGY

## 2017-08-03 PROCEDURE — 2580000003 HC RX 258: Performed by: ANESTHESIOLOGY

## 2017-08-03 PROCEDURE — 6360000002 HC RX W HCPCS: Performed by: FAMILY MEDICINE

## 2017-08-03 PROCEDURE — 80048 BASIC METABOLIC PNL TOTAL CA: CPT

## 2017-08-03 PROCEDURE — 97535 SELF CARE MNGMENT TRAINING: CPT

## 2017-08-03 PROCEDURE — 85027 COMPLETE CBC AUTOMATED: CPT

## 2017-08-03 RX ORDER — POTASSIUM CHLORIDE 29.8 MG/ML
60 INJECTION INTRAVENOUS ONCE
Status: DISCONTINUED | OUTPATIENT
Start: 2017-08-03 | End: 2017-08-03 | Stop reason: RX

## 2017-08-03 RX ORDER — POTASSIUM CHLORIDE 7.45 MG/ML
10 INJECTION INTRAVENOUS
Status: DISPENSED | OUTPATIENT
Start: 2017-08-03 | End: 2017-08-03

## 2017-08-03 RX ORDER — SODIUM CHLORIDE 450 MG/100ML
INJECTION, SOLUTION INTRAVENOUS CONTINUOUS
Status: DISCONTINUED | OUTPATIENT
Start: 2017-08-03 | End: 2017-08-03

## 2017-08-03 RX ADMIN — HYDROCORTISONE SODIUM SUCCINATE 50 MG: 100 INJECTION, POWDER, FOR SOLUTION INTRAMUSCULAR; INTRAVENOUS at 08:21

## 2017-08-03 RX ADMIN — POTASSIUM CHLORIDE: 149 INJECTION, SOLUTION, CONCENTRATE INTRAVENOUS at 14:13

## 2017-08-03 RX ADMIN — POTASSIUM CHLORIDE 10 MEQ: 7.46 INJECTION, SOLUTION INTRAVENOUS at 16:49

## 2017-08-03 RX ADMIN — POTASSIUM CHLORIDE 10 MEQ: 7.46 INJECTION, SOLUTION INTRAVENOUS at 11:43

## 2017-08-03 RX ADMIN — POTASSIUM CHLORIDE 10 MEQ: 7.46 INJECTION, SOLUTION INTRAVENOUS at 18:16

## 2017-08-03 RX ADMIN — PANTOPRAZOLE SODIUM 40 MG: 40 TABLET, DELAYED RELEASE ORAL at 08:20

## 2017-08-03 RX ADMIN — SODIUM CHLORIDE, PRESERVATIVE FREE 10 ML: 5 INJECTION INTRAVENOUS at 09:00

## 2017-08-03 RX ADMIN — DILTIAZEM HYDROCHLORIDE 240 MG: 240 CAPSULE, COATED, EXTENDED RELEASE ORAL at 20:56

## 2017-08-03 RX ADMIN — PIPERACILLIN SODIUM,TAZOBACTAM SODIUM 3.38 G: 3; .375 INJECTION, POWDER, FOR SOLUTION INTRAVENOUS at 17:49

## 2017-08-03 RX ADMIN — SODIUM CHLORIDE, PRESERVATIVE FREE 10 ML: 5 INJECTION INTRAVENOUS at 20:58

## 2017-08-03 RX ADMIN — PIPERACILLIN SODIUM,TAZOBACTAM SODIUM 3.38 G: 3; .375 INJECTION, POWDER, FOR SOLUTION INTRAVENOUS at 10:54

## 2017-08-03 RX ADMIN — LOSARTAN POTASSIUM 100 MG: 50 TABLET, FILM COATED ORAL at 08:20

## 2017-08-03 RX ADMIN — HYDRALAZINE HYDROCHLORIDE 25 MG: 25 TABLET, FILM COATED ORAL at 10:54

## 2017-08-03 RX ADMIN — PAROXETINE 15 MG: 10 TABLET, FILM COATED ORAL at 08:19

## 2017-08-03 RX ADMIN — PIPERACILLIN SODIUM,TAZOBACTAM SODIUM 3.38 G: 3; .375 INJECTION, POWDER, FOR SOLUTION INTRAVENOUS at 02:09

## 2017-08-03 RX ADMIN — ENOXAPARIN SODIUM 40 MG: 40 INJECTION SUBCUTANEOUS at 08:20

## 2017-08-03 RX ADMIN — LEVOTHYROXINE SODIUM 150 MCG: 150 TABLET ORAL at 06:22

## 2017-08-03 RX ADMIN — POTASSIUM CHLORIDE 10 MEQ: 7.46 INJECTION, SOLUTION INTRAVENOUS at 14:13

## 2017-08-03 RX ADMIN — HYDRALAZINE HYDROCHLORIDE 25 MG: 25 TABLET, FILM COATED ORAL at 20:56

## 2017-08-03 RX ADMIN — POTASSIUM CHLORIDE 10 MEQ: 7.46 INJECTION, SOLUTION INTRAVENOUS at 07:48

## 2017-08-03 RX ADMIN — DILTIAZEM HYDROCHLORIDE 240 MG: 240 CAPSULE, COATED, EXTENDED RELEASE ORAL at 10:54

## 2017-08-03 RX ADMIN — AZITHROMYCIN MONOHYDRATE 500 MG: 500 INJECTION, POWDER, LYOPHILIZED, FOR SOLUTION INTRAVENOUS at 08:22

## 2017-08-03 RX ADMIN — HYDROCORTISONE SODIUM SUCCINATE 50 MG: 100 INJECTION, POWDER, FOR SOLUTION INTRAMUSCULAR; INTRAVENOUS at 02:10

## 2017-08-03 ASSESSMENT — PAIN SCALES - GENERAL: PAINLEVEL_OUTOF10: 0

## 2017-08-04 LAB
ANION GAP SERPL CALCULATED.3IONS-SCNC: 10 MEQ/L (ref 7–13)
BUN BLDV-MCNC: 11 MG/DL (ref 8–23)
CALCIUM SERPL-MCNC: 7.1 MG/DL (ref 8.6–10.2)
CHLORIDE BLD-SCNC: 107 MEQ/L (ref 98–107)
CO2: 23 MEQ/L (ref 22–29)
CREAT SERPL-MCNC: 0.77 MG/DL (ref 0.5–0.9)
GFR AFRICAN AMERICAN: >60
GFR NON-AFRICAN AMERICAN: >60
GLUCOSE BLD-MCNC: 138 MG/DL (ref 74–109)
HCT VFR BLD CALC: 29.5 % (ref 37–47)
HEMOGLOBIN: 9.6 G/DL (ref 12–16)
MAGNESIUM: 1.8 MG/DL (ref 1.7–2.3)
MCH RBC QN AUTO: 29.1 PG (ref 27–31.3)
MCHC RBC AUTO-ENTMCNC: 32.4 % (ref 33–37)
MCV RBC AUTO: 89.7 FL (ref 82–100)
ORGANISM: ABNORMAL
PDW BLD-RTO: 15.5 % (ref 11.5–14.5)
PLATELET # BLD: 139 K/UL (ref 130–400)
POTASSIUM SERPL-SCNC: 5.9 MEQ/L (ref 3.5–5.1)
RBC # BLD: 3.29 M/UL (ref 4.2–5.4)
SODIUM BLD-SCNC: 140 MEQ/L (ref 132–144)
URINE CULTURE, ROUTINE: ABNORMAL
WBC # BLD: 7.3 K/UL (ref 4.8–10.8)

## 2017-08-04 PROCEDURE — 83735 ASSAY OF MAGNESIUM: CPT

## 2017-08-04 PROCEDURE — 2580000003 HC RX 258: Performed by: INTERNAL MEDICINE

## 2017-08-04 PROCEDURE — 6370000000 HC RX 637 (ALT 250 FOR IP): Performed by: INTERNAL MEDICINE

## 2017-08-04 PROCEDURE — 80048 BASIC METABOLIC PNL TOTAL CA: CPT

## 2017-08-04 PROCEDURE — 2580000003 HC RX 258: Performed by: FAMILY MEDICINE

## 2017-08-04 PROCEDURE — 85027 COMPLETE CBC AUTOMATED: CPT

## 2017-08-04 PROCEDURE — 97535 SELF CARE MNGMENT TRAINING: CPT

## 2017-08-04 PROCEDURE — 6370000000 HC RX 637 (ALT 250 FOR IP): Performed by: ANESTHESIOLOGY

## 2017-08-04 PROCEDURE — 6370000000 HC RX 637 (ALT 250 FOR IP): Performed by: FAMILY MEDICINE

## 2017-08-04 PROCEDURE — 1210000000 HC MED SURG R&B

## 2017-08-04 PROCEDURE — 97112 NEUROMUSCULAR REEDUCATION: CPT

## 2017-08-04 PROCEDURE — 6360000002 HC RX W HCPCS: Performed by: ANESTHESIOLOGY

## 2017-08-04 PROCEDURE — 2580000003 HC RX 258: Performed by: ANESTHESIOLOGY

## 2017-08-04 PROCEDURE — 6360000002 HC RX W HCPCS: Performed by: FAMILY MEDICINE

## 2017-08-04 RX ORDER — HYDROCHLOROTHIAZIDE 25 MG/1
25 TABLET ORAL DAILY
Qty: 30 TABLET | Refills: 0
Start: 2017-08-04 | End: 2017-08-07 | Stop reason: SDUPTHER

## 2017-08-04 RX ORDER — FUROSEMIDE 10 MG/ML
20 INJECTION INTRAMUSCULAR; INTRAVENOUS ONCE
Status: COMPLETED | OUTPATIENT
Start: 2017-08-04 | End: 2017-08-04

## 2017-08-04 RX ORDER — AMOXICILLIN AND CLAVULANATE POTASSIUM 875; 125 MG/1; MG/1
1 TABLET, FILM COATED ORAL 2 TIMES DAILY
Qty: 20 TABLET | Refills: 0
Start: 2017-08-04 | End: 2017-08-07 | Stop reason: SDUPTHER

## 2017-08-04 RX ORDER — SODIUM CHLORIDE 450 MG/100ML
INJECTION, SOLUTION INTRAVENOUS CONTINUOUS
Status: DISCONTINUED | OUTPATIENT
Start: 2017-08-04 | End: 2017-08-05 | Stop reason: HOSPADM

## 2017-08-04 RX ORDER — HYDROCHLOROTHIAZIDE 25 MG/1
25 TABLET ORAL DAILY
Status: DISCONTINUED | OUTPATIENT
Start: 2017-08-04 | End: 2017-08-05 | Stop reason: HOSPADM

## 2017-08-04 RX ORDER — LANOLIN ALCOHOL/MO/W.PET/CERES
3 CREAM (GRAM) TOPICAL NIGHTLY PRN
Status: DISCONTINUED | OUTPATIENT
Start: 2017-08-04 | End: 2017-08-05 | Stop reason: HOSPADM

## 2017-08-04 RX ORDER — SODIUM POLYSTYRENE SULFONATE 15 G/60ML
15 SUSPENSION ORAL; RECTAL ONCE
Status: COMPLETED | OUTPATIENT
Start: 2017-08-04 | End: 2017-08-04

## 2017-08-04 RX ORDER — HYDRALAZINE HYDROCHLORIDE 25 MG/1
25 TABLET, FILM COATED ORAL 2 TIMES DAILY
Qty: 60 TABLET | Refills: 0
Start: 2017-08-04 | End: 2017-08-07 | Stop reason: SDUPTHER

## 2017-08-04 RX ADMIN — DILTIAZEM HYDROCHLORIDE 240 MG: 240 CAPSULE, COATED, EXTENDED RELEASE ORAL at 10:52

## 2017-08-04 RX ADMIN — PANTOPRAZOLE SODIUM 40 MG: 40 TABLET, DELAYED RELEASE ORAL at 05:09

## 2017-08-04 RX ADMIN — PIPERACILLIN SODIUM,TAZOBACTAM SODIUM 3.38 G: 3; .375 INJECTION, POWDER, FOR SOLUTION INTRAVENOUS at 17:48

## 2017-08-04 RX ADMIN — SODIUM CHLORIDE: 4.5 INJECTION, SOLUTION INTRAVENOUS at 08:22

## 2017-08-04 RX ADMIN — SODIUM CHLORIDE, PRESERVATIVE FREE 10 ML: 5 INJECTION INTRAVENOUS at 21:31

## 2017-08-04 RX ADMIN — SODIUM CHLORIDE, PRESERVATIVE FREE 10 ML: 5 INJECTION INTRAVENOUS at 09:30

## 2017-08-04 RX ADMIN — DILTIAZEM HYDROCHLORIDE 240 MG: 240 CAPSULE, COATED, EXTENDED RELEASE ORAL at 21:30

## 2017-08-04 RX ADMIN — PIPERACILLIN SODIUM,TAZOBACTAM SODIUM 3.38 G: 3; .375 INJECTION, POWDER, FOR SOLUTION INTRAVENOUS at 02:30

## 2017-08-04 RX ADMIN — AZITHROMYCIN MONOHYDRATE 500 MG: 500 INJECTION, POWDER, LYOPHILIZED, FOR SOLUTION INTRAVENOUS at 08:23

## 2017-08-04 RX ADMIN — SODIUM POLYSTYRENE SULFONATE 15 G: 15 SUSPENSION ORAL; RECTAL at 08:27

## 2017-08-04 RX ADMIN — MELATONIN TAB 3 MG 3 MG: 3 TAB at 21:30

## 2017-08-04 RX ADMIN — HYDRALAZINE HYDROCHLORIDE 25 MG: 25 TABLET, FILM COATED ORAL at 21:30

## 2017-08-04 RX ADMIN — PAROXETINE 15 MG: 10 TABLET, FILM COATED ORAL at 08:28

## 2017-08-04 RX ADMIN — ENOXAPARIN SODIUM 40 MG: 40 INJECTION SUBCUTANEOUS at 08:28

## 2017-08-04 RX ADMIN — LOSARTAN POTASSIUM 100 MG: 50 TABLET, FILM COATED ORAL at 08:29

## 2017-08-04 RX ADMIN — HYDROCHLOROTHIAZIDE 25 MG: 25 TABLET ORAL at 12:57

## 2017-08-04 RX ADMIN — PIPERACILLIN SODIUM,TAZOBACTAM SODIUM 3.38 G: 3; .375 INJECTION, POWDER, FOR SOLUTION INTRAVENOUS at 11:33

## 2017-08-04 RX ADMIN — FUROSEMIDE 20 MG: 10 INJECTION, SOLUTION INTRAVENOUS at 12:57

## 2017-08-04 RX ADMIN — LEVOTHYROXINE SODIUM 150 MCG: 150 TABLET ORAL at 05:09

## 2017-08-04 RX ADMIN — HYDRALAZINE HYDROCHLORIDE 25 MG: 25 TABLET, FILM COATED ORAL at 10:52

## 2017-08-05 VITALS
WEIGHT: 146.39 LBS | DIASTOLIC BLOOD PRESSURE: 79 MMHG | SYSTOLIC BLOOD PRESSURE: 160 MMHG | BODY MASS INDEX: 27.64 KG/M2 | OXYGEN SATURATION: 96 % | TEMPERATURE: 97.5 F | RESPIRATION RATE: 18 BRPM | HEART RATE: 71 BPM | HEIGHT: 61 IN

## 2017-08-05 LAB
ANION GAP SERPL CALCULATED.3IONS-SCNC: 12 MEQ/L (ref 7–13)
BUN BLDV-MCNC: 11 MG/DL (ref 8–23)
CALCIUM SERPL-MCNC: 7.1 MG/DL (ref 8.6–10.2)
CHLORIDE BLD-SCNC: 100 MEQ/L (ref 98–107)
CO2: 27 MEQ/L (ref 22–29)
CREAT SERPL-MCNC: 0.85 MG/DL (ref 0.5–0.9)
GFR AFRICAN AMERICAN: >60
GFR NON-AFRICAN AMERICAN: >60
GLUCOSE BLD-MCNC: 88 MG/DL (ref 74–109)
POTASSIUM SERPL-SCNC: 2.3 MEQ/L (ref 3.5–5.1)
POTASSIUM SERPL-SCNC: 2.5 MEQ/L (ref 3.5–5.1)
SODIUM BLD-SCNC: 139 MEQ/L (ref 132–144)

## 2017-08-05 PROCEDURE — 6370000000 HC RX 637 (ALT 250 FOR IP): Performed by: INTERNAL MEDICINE

## 2017-08-05 PROCEDURE — 6360000002 HC RX W HCPCS: Performed by: ANESTHESIOLOGY

## 2017-08-05 PROCEDURE — 6370000000 HC RX 637 (ALT 250 FOR IP): Performed by: FAMILY MEDICINE

## 2017-08-05 PROCEDURE — 2580000003 HC RX 258: Performed by: ANESTHESIOLOGY

## 2017-08-05 PROCEDURE — 84132 ASSAY OF SERUM POTASSIUM: CPT

## 2017-08-05 PROCEDURE — 2580000003 HC RX 258: Performed by: INTERNAL MEDICINE

## 2017-08-05 PROCEDURE — 6360000002 HC RX W HCPCS: Performed by: FAMILY MEDICINE

## 2017-08-05 PROCEDURE — 80048 BASIC METABOLIC PNL TOTAL CA: CPT

## 2017-08-05 PROCEDURE — 6370000000 HC RX 637 (ALT 250 FOR IP): Performed by: ANESTHESIOLOGY

## 2017-08-05 RX ORDER — POTASSIUM CHLORIDE 20MEQ/15ML
40 LIQUID (ML) ORAL 2 TIMES DAILY
Status: DISCONTINUED | OUTPATIENT
Start: 2017-08-05 | End: 2017-08-05 | Stop reason: HOSPADM

## 2017-08-05 RX ADMIN — PAROXETINE 15 MG: 10 TABLET, FILM COATED ORAL at 09:35

## 2017-08-05 RX ADMIN — PIPERACILLIN SODIUM,TAZOBACTAM SODIUM 3.38 G: 3; .375 INJECTION, POWDER, FOR SOLUTION INTRAVENOUS at 01:49

## 2017-08-05 RX ADMIN — HYDRALAZINE HYDROCHLORIDE 25 MG: 25 TABLET, FILM COATED ORAL at 09:36

## 2017-08-05 RX ADMIN — PIPERACILLIN SODIUM,TAZOBACTAM SODIUM 3.38 G: 3; .375 INJECTION, POWDER, FOR SOLUTION INTRAVENOUS at 09:34

## 2017-08-05 RX ADMIN — LEVOTHYROXINE SODIUM 150 MCG: 150 TABLET ORAL at 06:59

## 2017-08-05 RX ADMIN — PANTOPRAZOLE SODIUM 40 MG: 40 TABLET, DELAYED RELEASE ORAL at 06:59

## 2017-08-05 RX ADMIN — ENOXAPARIN SODIUM 40 MG: 40 INJECTION SUBCUTANEOUS at 09:36

## 2017-08-05 RX ADMIN — HYDROCHLOROTHIAZIDE 25 MG: 25 TABLET ORAL at 09:35

## 2017-08-05 RX ADMIN — SODIUM CHLORIDE: 4.5 INJECTION, SOLUTION INTRAVENOUS at 01:49

## 2017-08-05 RX ADMIN — DILTIAZEM HYDROCHLORIDE 240 MG: 240 CAPSULE, COATED, EXTENDED RELEASE ORAL at 09:35

## 2017-08-05 RX ADMIN — POTASSIUM CHLORIDE 40 MEQ: 20 SOLUTION ORAL at 09:36

## 2017-08-05 RX ADMIN — SODIUM CHLORIDE, PRESERVATIVE FREE 10 ML: 5 INJECTION INTRAVENOUS at 09:40

## 2017-08-06 LAB
BLOOD CULTURE, ROUTINE: NORMAL
CULTURE, BLOOD 2: NORMAL

## 2017-08-07 ENCOUNTER — OFFICE VISIT (OUTPATIENT)
Dept: GERIATRIC MEDICINE | Age: 82
End: 2017-08-07

## 2017-08-07 ENCOUNTER — CARE COORDINATION (OUTPATIENT)
Dept: CASE MANAGEMENT | Age: 82
End: 2017-08-07

## 2017-08-07 DIAGNOSIS — E87.6 HYPOKALEMIA: ICD-10-CM

## 2017-08-07 DIAGNOSIS — M15.9 OSTEOARTHRITIS OF MULTIPLE JOINTS, UNSPECIFIED OSTEOARTHRITIS TYPE: ICD-10-CM

## 2017-08-07 DIAGNOSIS — R53.1 WEAKNESS: ICD-10-CM

## 2017-08-07 DIAGNOSIS — J18.9 PNEUMONIA, UNSPECIFIED ORGANISM: Primary | ICD-10-CM

## 2017-08-07 DIAGNOSIS — I10 ESSENTIAL HYPERTENSION: ICD-10-CM

## 2017-08-07 LAB
ALBUMIN SERPL-MCNC: 2.6 G/DL (ref 3.9–4.9)
ALP BLD-CCNC: 78 U/L (ref 40–130)
ALT SERPL-CCNC: 15 U/L (ref 0–33)
ANION GAP SERPL CALCULATED.3IONS-SCNC: 16 MEQ/L (ref 7–13)
AST SERPL-CCNC: 20 U/L (ref 0–35)
BILIRUB SERPL-MCNC: 0.3 MG/DL (ref 0–1.2)
BUN BLDV-MCNC: 18 MG/DL (ref 8–23)
CALCIUM SERPL-MCNC: 7.8 MG/DL (ref 8.6–10.2)
CHLORIDE BLD-SCNC: 102 MEQ/L (ref 98–107)
CO2: 27 MEQ/L (ref 22–29)
CREAT SERPL-MCNC: 1.13 MG/DL (ref 0.5–0.9)
EKG ATRIAL RATE: 107 BPM
EKG P AXIS: -16 DEGREES
EKG P-R INTERVAL: 200 MS
EKG Q-T INTERVAL: 392 MS
EKG QRS DURATION: 156 MS
EKG QTC CALCULATION (BAZETT): 523 MS
EKG R AXIS: -29 DEGREES
EKG T AXIS: -25 DEGREES
EKG VENTRICULAR RATE: 107 BPM
GFR AFRICAN AMERICAN: 53.9
GFR NON-AFRICAN AMERICAN: 44.6
GLOBULIN: 2.4 G/DL (ref 2.3–3.5)
GLUCOSE BLD-MCNC: 76 MG/DL (ref 74–109)
HCT VFR BLD CALC: 33.1 % (ref 37–47)
HEMOGLOBIN: 10.7 G/DL (ref 12–16)
MCH RBC QN AUTO: 28.6 PG (ref 27–31.3)
MCHC RBC AUTO-ENTMCNC: 32.4 % (ref 33–37)
MCV RBC AUTO: 88.5 FL (ref 82–100)
PDW BLD-RTO: 15.5 % (ref 11.5–14.5)
PLATELET # BLD: 141 K/UL (ref 130–400)
POTASSIUM SERPL-SCNC: 2.4 MEQ/L (ref 3.5–5.1)
RBC # BLD: 3.74 M/UL (ref 4.2–5.4)
SODIUM BLD-SCNC: 145 MEQ/L (ref 132–144)
TOTAL PROTEIN: 5 G/DL (ref 6.4–8.1)
WBC # BLD: 13.1 K/UL (ref 4.8–10.8)

## 2017-08-07 PROCEDURE — 99305 1ST NF CARE MODERATE MDM 35: CPT | Performed by: INTERNAL MEDICINE

## 2017-08-07 RX ORDER — DILTIAZEM HYDROCHLORIDE 240 MG/1
240 CAPSULE, COATED, EXTENDED RELEASE ORAL DAILY
COMMUNITY

## 2017-08-07 RX ORDER — LEVOTHYROXINE SODIUM 0.15 MG/1
150 TABLET ORAL DAILY
COMMUNITY

## 2017-08-07 RX ORDER — TORSEMIDE 5 MG/1
5 TABLET ORAL DAILY
COMMUNITY
End: 2017-08-15

## 2017-08-07 RX ORDER — PAROXETINE 10 MG/1
15 TABLET, FILM COATED ORAL EVERY MORNING
COMMUNITY

## 2017-08-07 RX ORDER — ACETAMINOPHEN 325 MG/1
650 TABLET ORAL EVERY 4 HOURS PRN
COMMUNITY

## 2017-08-07 RX ORDER — ALBUTEROL SULFATE 0.63 MG/3ML
1 SOLUTION RESPIRATORY (INHALATION) EVERY 4 HOURS PRN
Status: ON HOLD | COMMUNITY
End: 2017-11-22

## 2017-08-07 RX ORDER — CYANOCOBALAMIN 1000 UG/ML
1000 INJECTION, SOLUTION INTRAMUSCULAR; SUBCUTANEOUS ONCE
COMMUNITY

## 2017-08-07 RX ORDER — ASCORBIC ACID 500 MG
500 TABLET ORAL DAILY
COMMUNITY

## 2017-08-07 RX ORDER — HYDROCHLOROTHIAZIDE 12.5 MG/1
12.5 CAPSULE, GELATIN COATED ORAL DAILY
COMMUNITY

## 2017-08-07 RX ORDER — HYDRALAZINE HYDROCHLORIDE 25 MG/1
25 TABLET, FILM COATED ORAL 2 TIMES DAILY
Status: ON HOLD | COMMUNITY
End: 2017-11-24 | Stop reason: HOSPADM

## 2017-08-07 RX ORDER — AMOXICILLIN AND CLAVULANATE POTASSIUM 875; 125 MG/1; MG/1
1 TABLET, FILM COATED ORAL 2 TIMES DAILY
COMMUNITY
End: 2017-08-17 | Stop reason: ALTCHOICE

## 2017-08-08 LAB
ANION GAP SERPL CALCULATED.3IONS-SCNC: 15 MEQ/L (ref 7–13)
BUN BLDV-MCNC: 20 MG/DL (ref 8–23)
CALCIUM SERPL-MCNC: 8.1 MG/DL (ref 8.6–10.2)
CHLORIDE BLD-SCNC: 102 MEQ/L (ref 98–107)
CO2: 28 MEQ/L (ref 22–29)
CREAT SERPL-MCNC: 1.08 MG/DL (ref 0.5–0.9)
GFR AFRICAN AMERICAN: 56.8
GFR NON-AFRICAN AMERICAN: 47
GLUCOSE BLD-MCNC: 94 MG/DL (ref 74–109)
HCT VFR BLD CALC: 34.5 % (ref 37–47)
HEMOGLOBIN: 11.3 G/DL (ref 12–16)
MCH RBC QN AUTO: 28.7 PG (ref 27–31.3)
MCHC RBC AUTO-ENTMCNC: 32.6 % (ref 33–37)
MCV RBC AUTO: 87.9 FL (ref 82–100)
PDW BLD-RTO: 15.7 % (ref 11.5–14.5)
PLATELET # BLD: 149 K/UL (ref 130–400)
POTASSIUM SERPL-SCNC: 3.1 MEQ/L (ref 3.5–5.1)
PREALBUMIN: 14.2 MG/DL (ref 20–40)
RBC # BLD: 3.93 M/UL (ref 4.2–5.4)
SODIUM BLD-SCNC: 145 MEQ/L (ref 132–144)
WBC # BLD: 9.7 K/UL (ref 4.8–10.8)

## 2017-08-09 LAB — POTASSIUM SERPL-SCNC: 2.9 MEQ/L (ref 3.5–5.1)

## 2017-08-10 ENCOUNTER — OFFICE VISIT (OUTPATIENT)
Dept: GERIATRIC MEDICINE | Age: 82
End: 2017-08-10

## 2017-08-10 DIAGNOSIS — I10 ESSENTIAL HYPERTENSION, BENIGN: ICD-10-CM

## 2017-08-10 DIAGNOSIS — E83.51 HYPOCALCEMIA: ICD-10-CM

## 2017-08-10 DIAGNOSIS — E87.6 HYPOKALEMIA: Primary | ICD-10-CM

## 2017-08-10 DIAGNOSIS — R60.0 BILATERAL LOWER EXTREMITY EDEMA: ICD-10-CM

## 2017-08-10 DIAGNOSIS — D64.9 ANEMIA, UNSPECIFIED TYPE: ICD-10-CM

## 2017-08-10 LAB — POTASSIUM SERPL-SCNC: 3 MEQ/L (ref 3.5–5.1)

## 2017-08-10 PROCEDURE — 99309 SBSQ NF CARE MODERATE MDM 30: CPT | Performed by: NURSE PRACTITIONER

## 2017-08-11 VITALS
RESPIRATION RATE: 18 BRPM | OXYGEN SATURATION: 96 % | SYSTOLIC BLOOD PRESSURE: 134 MMHG | HEIGHT: 61 IN | HEART RATE: 78 BPM | BODY MASS INDEX: 26.06 KG/M2 | TEMPERATURE: 97.8 F | WEIGHT: 138 LBS | DIASTOLIC BLOOD PRESSURE: 70 MMHG

## 2017-08-11 VITALS — TEMPERATURE: 97.2 F | DIASTOLIC BLOOD PRESSURE: 60 MMHG | HEART RATE: 88 BPM | SYSTOLIC BLOOD PRESSURE: 113 MMHG

## 2017-08-11 LAB — POTASSIUM SERPL-SCNC: 3.1 MEQ/L (ref 3.5–5.1)

## 2017-08-15 LAB
ANION GAP SERPL CALCULATED.3IONS-SCNC: 15 MEQ/L (ref 7–13)
BASOPHILS ABSOLUTE: 0 K/UL (ref 0–0.2)
BASOPHILS RELATIVE PERCENT: 0.5 %
BUN BLDV-MCNC: 23 MG/DL (ref 8–23)
CALCIUM SERPL-MCNC: 8.3 MG/DL (ref 8.6–10.2)
CHLORIDE BLD-SCNC: 111 MEQ/L (ref 98–107)
CO2: 22 MEQ/L (ref 22–29)
CREAT SERPL-MCNC: 1.29 MG/DL (ref 0.5–0.9)
EOSINOPHILS ABSOLUTE: 0.3 K/UL (ref 0–0.7)
EOSINOPHILS RELATIVE PERCENT: 4 %
GFR AFRICAN AMERICAN: 46.3
GFR NON-AFRICAN AMERICAN: 38.3
GLUCOSE BLD-MCNC: 75 MG/DL (ref 74–109)
HCT VFR BLD CALC: 30.9 % (ref 37–47)
HEMOGLOBIN: 9.9 G/DL (ref 12–16)
LYMPHOCYTES ABSOLUTE: 0.9 K/UL (ref 1–4.8)
LYMPHOCYTES RELATIVE PERCENT: 14.3 %
MAGNESIUM: 2 MG/DL (ref 1.7–2.3)
MCH RBC QN AUTO: 28.4 PG (ref 27–31.3)
MCHC RBC AUTO-ENTMCNC: 32 % (ref 33–37)
MCV RBC AUTO: 88.8 FL (ref 82–100)
MONOCYTES ABSOLUTE: 0.4 K/UL (ref 0.2–0.8)
MONOCYTES RELATIVE PERCENT: 6 %
NEUTROPHILS ABSOLUTE: 4.9 K/UL (ref 1.4–6.5)
NEUTROPHILS RELATIVE PERCENT: 75.2 %
PDW BLD-RTO: 16.7 % (ref 11.5–14.5)
PLATELET # BLD: 142 K/UL (ref 130–400)
POTASSIUM SERPL-SCNC: 3.5 MEQ/L (ref 3.5–5.1)
RBC # BLD: 3.48 M/UL (ref 4.2–5.4)
SODIUM BLD-SCNC: 148 MEQ/L (ref 132–144)
VITAMIN D 25-HYDROXY: 7.8 NG/ML (ref 30–100)
WBC # BLD: 6.5 K/UL (ref 4.8–10.8)

## 2017-08-15 ASSESSMENT — ENCOUNTER SYMPTOMS
SHORTNESS OF BREATH: 0
EYE PAIN: 0
ABDOMINAL PAIN: 0
WHEEZING: 0
CONSTIPATION: 0
COUGH: 0

## 2017-08-16 ENCOUNTER — OFFICE VISIT (OUTPATIENT)
Dept: GERIATRIC MEDICINE | Age: 82
End: 2017-08-16

## 2017-08-16 DIAGNOSIS — R53.1 GENERALIZED WEAKNESS: ICD-10-CM

## 2017-08-16 DIAGNOSIS — E87.6 HYPOKALEMIA: ICD-10-CM

## 2017-08-16 DIAGNOSIS — E55.9 VITAMIN D DEFICIENCY: Primary | ICD-10-CM

## 2017-08-16 DIAGNOSIS — R60.0 BILATERAL LOWER EXTREMITY EDEMA: ICD-10-CM

## 2017-08-16 DIAGNOSIS — I10 ESSENTIAL HYPERTENSION, BENIGN: ICD-10-CM

## 2017-08-16 PROCEDURE — 99309 SBSQ NF CARE MODERATE MDM 30: CPT | Performed by: NURSE PRACTITIONER

## 2017-08-17 VITALS
TEMPERATURE: 97.2 F | DIASTOLIC BLOOD PRESSURE: 68 MMHG | WEIGHT: 133 LBS | RESPIRATION RATE: 18 BRPM | SYSTOLIC BLOOD PRESSURE: 122 MMHG | HEIGHT: 61 IN | HEART RATE: 88 BPM | BODY MASS INDEX: 25.11 KG/M2 | OXYGEN SATURATION: 97 %

## 2017-08-17 LAB — MAGNESIUM: 2 MG/DL (ref 1.7–2.3)

## 2017-08-17 RX ORDER — MULTIVIT-MIN/IRON/FOLIC ACID/K 18-600-40
1 CAPSULE ORAL DAILY
COMMUNITY
Start: 2017-10-11 | End: 2018-03-05 | Stop reason: ALTCHOICE

## 2017-08-17 RX ORDER — ERGOCALCIFEROL 1.25 MG/1
50000 CAPSULE ORAL WEEKLY
Status: ON HOLD | COMMUNITY
Start: 2017-08-16 | End: 2017-11-24 | Stop reason: HOSPADM

## 2017-08-17 ASSESSMENT — ENCOUNTER SYMPTOMS
COUGH: 0
ABDOMINAL PAIN: 0
WHEEZING: 0
SHORTNESS OF BREATH: 0
EYE DISCHARGE: 0
CONSTIPATION: 0

## 2017-08-21 LAB
ALBUMIN SERPL-MCNC: 2.6 G/DL (ref 3.9–4.9)
ALP BLD-CCNC: 72 U/L (ref 40–130)
ALT SERPL-CCNC: 10 U/L (ref 0–33)
ANION GAP SERPL CALCULATED.3IONS-SCNC: 16 MEQ/L (ref 7–13)
AST SERPL-CCNC: 13 U/L (ref 0–35)
BILIRUB SERPL-MCNC: 0.3 MG/DL (ref 0–1.2)
BUN BLDV-MCNC: 21 MG/DL (ref 8–23)
CALCIUM SERPL-MCNC: 7.7 MG/DL (ref 8.6–10.2)
CHLORIDE BLD-SCNC: 107 MEQ/L (ref 98–107)
CO2: 24 MEQ/L (ref 22–29)
CREAT SERPL-MCNC: 1.25 MG/DL (ref 0.5–0.9)
GFR AFRICAN AMERICAN: 48
GFR NON-AFRICAN AMERICAN: 39.7
GLOBULIN: 2.5 G/DL (ref 2.3–3.5)
GLUCOSE BLD-MCNC: 68 MG/DL (ref 74–109)
HCT VFR BLD CALC: 30.2 % (ref 37–47)
HEMOGLOBIN: 9.9 G/DL (ref 12–16)
MCH RBC QN AUTO: 28.8 PG (ref 27–31.3)
MCHC RBC AUTO-ENTMCNC: 33 % (ref 33–37)
MCV RBC AUTO: 87.4 FL (ref 82–100)
PDW BLD-RTO: 17.2 % (ref 11.5–14.5)
PLATELET # BLD: 172 K/UL (ref 130–400)
POTASSIUM SERPL-SCNC: 2.5 MEQ/L (ref 3.5–5.1)
RBC # BLD: 3.45 M/UL (ref 4.2–5.4)
SODIUM BLD-SCNC: 147 MEQ/L (ref 132–144)
TOTAL PROTEIN: 5.1 G/DL (ref 6.4–8.1)
WBC # BLD: 4.7 K/UL (ref 4.8–10.8)

## 2017-08-22 LAB — POTASSIUM SERPL-SCNC: 3.1 MEQ/L (ref 3.5–5.1)

## 2017-08-24 LAB — POTASSIUM SERPL-SCNC: 5.6 MEQ/L (ref 3.5–5.1)

## 2017-08-25 ENCOUNTER — OFFICE VISIT (OUTPATIENT)
Dept: GERIATRIC MEDICINE | Age: 82
End: 2017-08-25

## 2017-08-25 VITALS
SYSTOLIC BLOOD PRESSURE: 125 MMHG | RESPIRATION RATE: 18 BRPM | WEIGHT: 132 LBS | DIASTOLIC BLOOD PRESSURE: 67 MMHG | OXYGEN SATURATION: 95 % | TEMPERATURE: 97 F | BODY MASS INDEX: 24.92 KG/M2 | HEART RATE: 72 BPM | HEIGHT: 61 IN

## 2017-08-25 DIAGNOSIS — R53.1 GENERALIZED WEAKNESS: Primary | ICD-10-CM

## 2017-08-25 DIAGNOSIS — R60.0 BILATERAL LOWER EXTREMITY EDEMA: ICD-10-CM

## 2017-08-25 DIAGNOSIS — I10 ESSENTIAL HYPERTENSION: ICD-10-CM

## 2017-08-25 DIAGNOSIS — E55.9 VITAMIN D DEFICIENCY: ICD-10-CM

## 2017-08-25 DIAGNOSIS — E87.6 HYPOKALEMIA: ICD-10-CM

## 2017-08-25 PROCEDURE — 99316 NF DSCHRG MGMT 30 MIN+: CPT | Performed by: NURSE PRACTITIONER

## 2017-08-25 RX ORDER — POTASSIUM CHLORIDE 750 MG/1
20 CAPSULE, EXTENDED RELEASE ORAL DAILY
Status: ON HOLD | COMMUNITY
End: 2018-02-13

## 2017-08-25 RX ORDER — LANOLIN ALCOHOL/MO/W.PET/CERES
3 CREAM (GRAM) TOPICAL DAILY
COMMUNITY

## 2017-08-26 ASSESSMENT — ENCOUNTER SYMPTOMS
ABDOMINAL PAIN: 0
EYE DISCHARGE: 0
CONSTIPATION: 0
WHEEZING: 0
SHORTNESS OF BREATH: 0
COUGH: 0

## 2017-08-28 ENCOUNTER — CARE COORDINATION (OUTPATIENT)
Dept: CASE MANAGEMENT | Age: 82
End: 2017-08-28

## 2017-09-14 ENCOUNTER — CARE COORDINATION (OUTPATIENT)
Dept: CASE MANAGEMENT | Age: 82
End: 2017-09-14

## 2017-11-22 ENCOUNTER — HOSPITAL ENCOUNTER (OUTPATIENT)
Age: 82
Setting detail: OBSERVATION
Discharge: HOME | End: 2017-11-24
Attending: INTERNAL MEDICINE | Admitting: INTERNAL MEDICINE
Payer: MEDICARE

## 2017-11-22 ENCOUNTER — APPOINTMENT (OUTPATIENT)
Dept: MRI IMAGING | Age: 82
End: 2017-11-22
Payer: MEDICARE

## 2017-11-22 ENCOUNTER — APPOINTMENT (OUTPATIENT)
Dept: CT IMAGING | Age: 82
End: 2017-11-22
Payer: MEDICARE

## 2017-11-22 DIAGNOSIS — I63.9 CEREBROVASCULAR ACCIDENT (CVA), UNSPECIFIED MECHANISM (HCC): Primary | ICD-10-CM

## 2017-11-22 PROBLEM — G45.9 TIA (TRANSIENT ISCHEMIC ATTACK): Status: ACTIVE | Noted: 2017-11-22

## 2017-11-22 LAB
ALBUMIN SERPL-MCNC: 3.5 G/DL (ref 3.9–4.9)
ALP BLD-CCNC: 87 U/L (ref 40–130)
ALT SERPL-CCNC: 11 U/L (ref 0–33)
ANION GAP SERPL CALCULATED.3IONS-SCNC: 14 MEQ/L (ref 7–13)
AST SERPL-CCNC: 17 U/L (ref 0–35)
BACTERIA: NORMAL /HPF
BILIRUB SERPL-MCNC: 0.2 MG/DL (ref 0–1.2)
BILIRUBIN URINE: NEGATIVE
BLOOD, URINE: NEGATIVE
BUN BLDV-MCNC: 24 MG/DL (ref 8–23)
CALCIUM SERPL-MCNC: 9.4 MG/DL (ref 8.6–10.2)
CHLORIDE BLD-SCNC: 105 MEQ/L (ref 98–107)
CLARITY: CLEAR
CO2: 23 MEQ/L (ref 22–29)
COLOR: YELLOW
CREAT SERPL-MCNC: 1.19 MG/DL (ref 0.5–0.9)
EPITHELIAL CELLS, UA: NORMAL /HPF
GFR AFRICAN AMERICAN: 50.8
GFR NON-AFRICAN AMERICAN: 42
GLOBULIN: 3.6 G/DL (ref 2.3–3.5)
GLUCOSE BLD-MCNC: 115 MG/DL (ref 74–109)
GLUCOSE URINE: NEGATIVE MG/DL
HCT VFR BLD CALC: 36.4 % (ref 37–47)
HEMOGLOBIN: 11.7 G/DL (ref 12–16)
KETONES, URINE: NEGATIVE MG/DL
LACTIC ACID: 0.9 MMOL/L (ref 0.5–2.2)
LEUKOCYTE ESTERASE, URINE: ABNORMAL
MCH RBC QN AUTO: 30 PG (ref 27–31.3)
MCHC RBC AUTO-ENTMCNC: 32.2 % (ref 33–37)
MCV RBC AUTO: 93.1 FL (ref 82–100)
NITRITE, URINE: NEGATIVE
PDW BLD-RTO: 14.5 % (ref 11.5–14.5)
PH UA: 7 (ref 5–9)
PLATELET # BLD: 215 K/UL (ref 130–400)
POTASSIUM SERPL-SCNC: 5.2 MEQ/L (ref 3.5–5.1)
PROTEIN UA: NEGATIVE MG/DL
RBC # BLD: 3.91 M/UL (ref 4.2–5.4)
RBC UA: NORMAL /HPF (ref 0–2)
SODIUM BLD-SCNC: 142 MEQ/L (ref 132–144)
SPECIFIC GRAVITY UA: 1.01 (ref 1–1.03)
TOTAL PROTEIN: 7.1 G/DL (ref 6.4–8.1)
URINE REFLEX TO CULTURE: YES
UROBILINOGEN, URINE: 0.2 E.U./DL
WBC # BLD: 6.9 K/UL (ref 4.8–10.8)
WBC UA: NORMAL /HPF (ref 0–5)

## 2017-11-22 PROCEDURE — 2580000003 HC RX 258: Performed by: NURSE PRACTITIONER

## 2017-11-22 PROCEDURE — 6370000000 HC RX 637 (ALT 250 FOR IP): Performed by: PHYSICIAN ASSISTANT

## 2017-11-22 PROCEDURE — 70544 MR ANGIOGRAPHY HEAD W/O DYE: CPT

## 2017-11-22 PROCEDURE — 87086 URINE CULTURE/COLONY COUNT: CPT

## 2017-11-22 PROCEDURE — 81001 URINALYSIS AUTO W/SCOPE: CPT

## 2017-11-22 PROCEDURE — G0378 HOSPITAL OBSERVATION PER HR: HCPCS

## 2017-11-22 PROCEDURE — 2580000003 HC RX 258: Performed by: PHYSICIAN ASSISTANT

## 2017-11-22 PROCEDURE — 83605 ASSAY OF LACTIC ACID: CPT

## 2017-11-22 PROCEDURE — 99285 EMERGENCY DEPT VISIT HI MDM: CPT

## 2017-11-22 PROCEDURE — 70551 MRI BRAIN STEM W/O DYE: CPT

## 2017-11-22 PROCEDURE — 87040 BLOOD CULTURE FOR BACTERIA: CPT

## 2017-11-22 PROCEDURE — 96374 THER/PROPH/DIAG INJ IV PUSH: CPT

## 2017-11-22 PROCEDURE — 85027 COMPLETE CBC AUTOMATED: CPT

## 2017-11-22 PROCEDURE — 80053 COMPREHEN METABOLIC PANEL: CPT

## 2017-11-22 PROCEDURE — 36415 COLL VENOUS BLD VENIPUNCTURE: CPT

## 2017-11-22 PROCEDURE — 6360000002 HC RX W HCPCS: Performed by: PHYSICIAN ASSISTANT

## 2017-11-22 PROCEDURE — 70450 CT HEAD/BRAIN W/O DYE: CPT

## 2017-11-22 RX ORDER — HYDROCHLOROTHIAZIDE 12.5 MG/1
12.5 CAPSULE, GELATIN COATED ORAL 2 TIMES DAILY
Status: DISCONTINUED | OUTPATIENT
Start: 2017-11-22 | End: 2017-11-24 | Stop reason: HOSPADM

## 2017-11-22 RX ORDER — PAROXETINE 10 MG/1
15 TABLET, FILM COATED ORAL EVERY MORNING
Status: DISCONTINUED | OUTPATIENT
Start: 2017-11-23 | End: 2017-11-24 | Stop reason: HOSPADM

## 2017-11-22 RX ORDER — ASPIRIN 81 MG/1
81 TABLET ORAL DAILY
Status: DISCONTINUED | OUTPATIENT
Start: 2017-11-23 | End: 2017-11-22

## 2017-11-22 RX ORDER — SODIUM CHLORIDE 0.9 % (FLUSH) 0.9 %
10 SYRINGE (ML) INJECTION PRN
Status: DISCONTINUED | OUTPATIENT
Start: 2017-11-22 | End: 2017-11-24 | Stop reason: HOSPADM

## 2017-11-22 RX ORDER — HYDRALAZINE HYDROCHLORIDE 25 MG/1
25 TABLET, FILM COATED ORAL 2 TIMES DAILY
Status: DISCONTINUED | OUTPATIENT
Start: 2017-11-22 | End: 2017-11-24

## 2017-11-22 RX ORDER — ONDANSETRON 2 MG/ML
4 INJECTION INTRAMUSCULAR; INTRAVENOUS EVERY 6 HOURS PRN
Status: DISCONTINUED | OUTPATIENT
Start: 2017-11-22 | End: 2017-11-24 | Stop reason: HOSPADM

## 2017-11-22 RX ORDER — LEVOTHYROXINE SODIUM 0.15 MG/1
150 TABLET ORAL DAILY
Status: DISCONTINUED | OUTPATIENT
Start: 2017-11-23 | End: 2017-11-24 | Stop reason: HOSPADM

## 2017-11-22 RX ORDER — ACETAMINOPHEN 325 MG/1
650 TABLET ORAL EVERY 4 HOURS PRN
Status: DISCONTINUED | OUTPATIENT
Start: 2017-11-22 | End: 2017-11-24 | Stop reason: HOSPADM

## 2017-11-22 RX ORDER — LABETALOL HYDROCHLORIDE 5 MG/ML
10 INJECTION, SOLUTION INTRAVENOUS EVERY 4 HOURS PRN
Status: DISCONTINUED | OUTPATIENT
Start: 2017-11-22 | End: 2017-11-23

## 2017-11-22 RX ORDER — ACETAMINOPHEN 80 MG
TABLET,CHEWABLE ORAL ONCE
Status: DISCONTINUED | OUTPATIENT
Start: 2017-11-22 | End: 2017-11-24 | Stop reason: HOSPADM

## 2017-11-22 RX ORDER — SODIUM CHLORIDE 9 MG/ML
INJECTION, SOLUTION INTRAVENOUS CONTINUOUS
Status: DISCONTINUED | OUTPATIENT
Start: 2017-11-22 | End: 2017-11-24 | Stop reason: HOSPADM

## 2017-11-22 RX ORDER — SODIUM CHLORIDE 0.9 % (FLUSH) 0.9 %
10 SYRINGE (ML) INJECTION EVERY 12 HOURS SCHEDULED
Status: DISCONTINUED | OUTPATIENT
Start: 2017-11-22 | End: 2017-11-24 | Stop reason: HOSPADM

## 2017-11-22 RX ORDER — DILTIAZEM HYDROCHLORIDE 240 MG/1
240 CAPSULE, COATED, EXTENDED RELEASE ORAL DAILY
Status: DISCONTINUED | OUTPATIENT
Start: 2017-11-23 | End: 2017-11-24 | Stop reason: HOSPADM

## 2017-11-22 RX ORDER — LANOLIN ALCOHOL/MO/W.PET/CERES
3 CREAM (GRAM) TOPICAL NIGHTLY PRN
Status: DISCONTINUED | OUTPATIENT
Start: 2017-11-23 | End: 2017-11-23

## 2017-11-22 RX ORDER — CIPROFLOXACIN 250 MG/1
250 TABLET, FILM COATED ORAL 2 TIMES DAILY
Status: ON HOLD | COMMUNITY
Start: 2017-11-20 | End: 2017-11-24 | Stop reason: HOSPADM

## 2017-11-22 RX ORDER — SIMVASTATIN 20 MG
20 TABLET ORAL NIGHTLY
Status: DISCONTINUED | OUTPATIENT
Start: 2017-11-22 | End: 2017-11-24 | Stop reason: HOSPADM

## 2017-11-22 RX ORDER — ASPIRIN 81 MG/1
81 TABLET, CHEWABLE ORAL DAILY
Status: DISCONTINUED | OUTPATIENT
Start: 2017-11-23 | End: 2017-11-24 | Stop reason: HOSPADM

## 2017-11-22 RX ORDER — 0.9 % SODIUM CHLORIDE 0.9 %
30 INTRAVENOUS SOLUTION INTRAVENOUS ONCE
Status: COMPLETED | OUTPATIENT
Start: 2017-11-22 | End: 2017-11-22

## 2017-11-22 RX ADMIN — SODIUM CHLORIDE 1905 ML: 9 INJECTION, SOLUTION INTRAVENOUS at 15:15

## 2017-11-22 RX ADMIN — SODIUM CHLORIDE: 9 INJECTION, SOLUTION INTRAVENOUS at 21:37

## 2017-11-22 RX ADMIN — CEFTRIAXONE 1 G: 1 INJECTION, POWDER, FOR SOLUTION INTRAMUSCULAR; INTRAVENOUS at 22:26

## 2017-11-22 RX ADMIN — SODIUM CHLORIDE, PRESERVATIVE FREE 10 ML: 5 INJECTION INTRAVENOUS at 22:28

## 2017-11-22 RX ADMIN — SIMVASTATIN 20 MG: 20 TABLET, FILM COATED ORAL at 22:26

## 2017-11-22 ASSESSMENT — ENCOUNTER SYMPTOMS
COUGH: 0
NAUSEA: 0
COLOR CHANGE: 0
BACK PAIN: 0
TROUBLE SWALLOWING: 0
STRIDOR: 0
CONSTIPATION: 0
DIARRHEA: 0
WHEEZING: 0
ABDOMINAL PAIN: 0
BLURRED VISION: 0
SHORTNESS OF BREATH: 0
VOMITING: 0
CHEST TIGHTNESS: 0

## 2017-11-22 ASSESSMENT — PAIN SCALES - GENERAL: PAINLEVEL_OUTOF10: 0

## 2017-11-22 NOTE — ED PROVIDER NOTES
edema and weakness    Associated symptoms: no abdominal pain, no anxiety, no blurred vision, no chest pain, no dizziness, no ear pain, no epistaxis, no fatigue, no fever, no headaches, no hematuria, no hypokalemia, no loss of consciousness, no nausea, no neck pain, no palpitations, no shortness of breath, no syncope, no tinnitus and not vomiting    Risk factors: cardiac disease    Risk factors: no alcohol use, no cocaine use, no decongestant use, no diabetes, no family history of hypertension, no kidney disease, no obesity, no prior aneurysm, no prior stroke, no PVD and no tobacco use    Urinary Tract Infection   Pertinent negatives include no chest pain, no abdominal pain, no headaches and no shortness of breath. Nursing Notes were reviewed. REVIEW OF SYSTEMS    (2+ for level 4; 10+ for level 5)     Review of Systems   Constitutional: Positive for activity change. Negative for appetite change, chills, diaphoresis, fatigue and fever. HENT: Negative for congestion, drooling, ear discharge, ear pain, nosebleeds, tinnitus and trouble swallowing. Eyes: Negative for blurred vision. Respiratory: Negative for cough, chest tightness, shortness of breath, wheezing and stridor. Cardiovascular: Negative for chest pain, palpitations and syncope. Gastrointestinal: Negative for abdominal pain, constipation, diarrhea, nausea and vomiting. Genitourinary: Negative for decreased urine volume, difficulty urinating, dysuria and hematuria. Musculoskeletal: Negative for arthralgias, back pain and neck pain. Skin: Negative for color change. Neurological: Positive for weakness. Negative for dizziness, seizures, loss of consciousness, numbness and headaches. Psychiatric/Behavioral: Positive for confusion. Negative for agitation and behavioral problems. The patient is not nervous/anxious. Except as noted above the remainder of the review of systems was reviewed and negative.      PAST MEDICAL HISTORY Past Medical History:   Diagnosis Date    Bladder [facility] ([facility] [address]     Cataract extraction status of left eye     Cataract extraction status of right eye     Colon cancer ([facility] [address]     Essential hypertension, benign 9/9/2011    H/O colonoscopy 6/6/2012    [facility]    History of non anemic vitamin B12 deficiency 1/11/2012    Leukopenia 9/9/2011    Osteoarthritis of right hip 1/20/2014    Overactive bladder 11/20/2013    Primary hypothyroidism 9/9/2011    S/P bilateral breast reduction     S/P cystoscopy 2009    surveilance for Hx of resected Bladder cancer Dr Jose Alfredo Smith S/P cystoscopy 2010    susurveilance for Hx of resected Bladder cancer Dr Jose Alfredo Smith S/P left colectomy     for colon cancer    S/P right colectomy     for villous adenoma with foci of adenocarcinoma    S/P total abdominal hysterectomy and bilateral salpingo-oophorectomy     Urinary bladder cancer ([facility] [address]     Resection of noninvasive bladder cancer    Urinary incontinence 11/20/2013    Weight loss, unintentional 7/23/2013       SURGICAL HISTORY       Past Surgical History:   Procedure Laterality Date    BLADDER TUMOR EXCISION      Resection of noninvasive bladder cancer    BREAST REDUCTION SURGERY      bilateral    CATARACT REMOVAL WITH IMPLANT      CATARACT REMOVAL WITH IMPLANT      COLON SURGERY      CYSTOSCOPY      susurveilance for Hx of resected Bladder cancer Dr Guadlupe Scheuermann for Hx of resected Bladder cancer Dr Subhash Dunn      left, for colon cancer    HEMICOLECTOMY      right, for villous adenoma with foci of adenocarcinoma    HYSTERECTOMY      TEOFILO AND BSO         CURRENT MEDICATIONS       Previous Medications    ACETAMINOPHEN (TYLENOL) 325 MG TABLET    Take 650 mg by mouth every 4 hours as needed for Pain    ALBUTEROL (ACCUNEB) 0.63 MG/3ML NEBULIZER SOLUTION    Take 1 ampule by nebulization every 4 hours as needed for Wheezing    ASCORBIC ACID (VITAMIN C) 500 MG TABLET    Take 500 mg by mouth daily    ASPIRIN 81 MG TABLET    Take 81 mg by mouth daily    CHOLECALCIFEROL (VITAMIN D) 2000 UNITS CAPS CAPSULE    Take 1 capsule by mouth daily    CIPROFLOXACIN (CIPRO) 250 MG TABLET    Take 250 mg by mouth 2 times daily    CYANOCOBALAMIN 1000 MCG/ML INJECTION    Inject 1,000 mcg into the muscle once THE 12TH OF EVERY MONTH    DILTIAZEM (CARDIZEM CD) 240 MG EXTENDED RELEASE CAPSULE    Take 240 mg by mouth 2 times daily    HYDRALAZINE (APRESOLINE) 25 MG TABLET    Take 25 mg by mouth 2 times daily    HYDROCHLOROTHIAZIDE (HYDRODIURIL) 25 MG TABLET    Take 12.5 mg by mouth daily     LEVOTHYROXINE (SYNTHROID) 150 MCG TABLET    Take 150 mcg by mouth Daily    MELATONIN 3 MG TABS TABLET    Take 3 mg by mouth nightly as needed    MULTIPLE VITAMIN (MULTIVITAMINS PO)    Take by mouth every morning    PAROXETINE (PAXIL) 10 MG TABLET    Take 15 mg by mouth every morning    POTASSIUM CHLORIDE (MICRO-K) 10 MEQ EXTENDED RELEASE CAPSULE    Take 40 mEq by mouth daily    VITAMIN D (ERGOCALCIFEROL) 41698 UNITS CAPS CAPSULE    Take 50,000 Units by mouth once a week       ALLERGIES     Erythromycin and Tetracyclines & related    FAMILY HISTORY     History reviewed. No pertinent family history. SOCIAL HISTORY       Social History     Social History    Marital status:      Spouse name: N/A    Number of children: N/A    Years of education: N/A     Social History Main Topics    Smoking status: Former Smoker     Packs/day: 0.30     Types: Cigarettes     Quit date: 1/1/1981    Smokeless tobacco: Never Used    Alcohol use No    Drug use: No    Sexual activity: No     Other Topics Concern    None     Social History Narrative    None       SCREENINGS   NIH Stroke Scale  NIH Stroke Scale Assessed: Yes  Interval: Baseline  Level of Consciousness (1a. ): Alert  LOC Questions (1b. ):  Answers both correctly  LOC Commands (1c. ): Obeys both correctly  Best Gaze (2. ): Normal  Visual (3. ): No visual loss  Facial Palsy required my urgent intervention. This includes discussing the case with consultants, reviewing laboratory studies and images independently, arranging disposition, and speaking with patient/family    CONSULTS:  IP CONSULT TO HOSPITALIST  IP CONSULT TO NEUROLOGY  IP CONSULT TO CARDIOLOGY    PROCEDURES:  Unless otherwise noted below, none     Procedures    FINAL IMPRESSION      1.  Cerebrovascular accident (CVA), unspecified mechanism [facility])          DISPOSITION/PLAN   DISPOSITION Admitted    PATIENT REFERRED TO:  [address] [place] [address]  3 [facility] Cayla Gottlieb DO  [address]  [phone]            DISCHARGE MEDICATIONS:  New Prescriptions    No medications on file          (Please note that portions of this note were completed with a voice recognition program.  Efforts were made to edit the dictations but occasionally words and phrases are mis-transcribed.)    Kevin Crespo NP  (electronically signed)                 Kevin Crespo NP  11/22/17 1920       Amanda Roa NP  11/22/17 [id number]

## 2017-11-23 ENCOUNTER — APPOINTMENT (OUTPATIENT)
Dept: ULTRASOUND IMAGING | Age: 82
End: 2017-11-23
Payer: MEDICARE

## 2017-11-23 LAB
ANION GAP SERPL CALCULATED.3IONS-SCNC: 13 MEQ/L (ref 7–13)
BASOPHILS ABSOLUTE: 0 K/UL (ref 0–0.2)
BASOPHILS RELATIVE PERCENT: 0.3 %
BUN BLDV-MCNC: 17 MG/DL (ref 8–23)
CALCIUM SERPL-MCNC: 8.5 MG/DL (ref 8.6–10.2)
CHLORIDE BLD-SCNC: 106 MEQ/L (ref 98–107)
CHOLESTEROL, TOTAL: 134 MG/DL (ref 0–199)
CO2: 24 MEQ/L (ref 22–29)
CREAT SERPL-MCNC: 0.99 MG/DL (ref 0.5–0.9)
EOSINOPHILS ABSOLUTE: 0.1 K/UL (ref 0–0.7)
EOSINOPHILS RELATIVE PERCENT: 2.6 %
GFR AFRICAN AMERICAN: >60
GFR NON-AFRICAN AMERICAN: 51.9
GLUCOSE BLD-MCNC: 95 MG/DL (ref 74–109)
HCT VFR BLD CALC: 29.4 % (ref 37–47)
HDLC SERPL-MCNC: 46 MG/DL (ref 40–59)
HEMOGLOBIN: 9.9 G/DL (ref 12–16)
LDL CHOLESTEROL CALCULATED: 75 MG/DL (ref 0–129)
LYMPHOCYTES ABSOLUTE: 0.9 K/UL (ref 1–4.8)
LYMPHOCYTES RELATIVE PERCENT: 17.9 %
MCH RBC QN AUTO: 30.8 PG (ref 27–31.3)
MCHC RBC AUTO-ENTMCNC: 33.6 % (ref 33–37)
MCV RBC AUTO: 91.6 FL (ref 82–100)
MONOCYTES ABSOLUTE: 0.4 K/UL (ref 0.2–0.8)
MONOCYTES RELATIVE PERCENT: 7.4 %
NEUTROPHILS ABSOLUTE: 3.8 K/UL (ref 1.4–6.5)
NEUTROPHILS RELATIVE PERCENT: 71.8 %
PDW BLD-RTO: 14.5 % (ref 11.5–14.5)
PLATELET # BLD: 170 K/UL (ref 130–400)
POTASSIUM SERPL-SCNC: 3.8 MEQ/L (ref 3.5–5.1)
RBC # BLD: 3.21 M/UL (ref 4.2–5.4)
SODIUM BLD-SCNC: 143 MEQ/L (ref 132–144)
TRIGL SERPL-MCNC: 63 MG/DL (ref 0–200)
TSH SERPL DL<=0.05 MIU/L-ACNC: 8.04 UIU/ML (ref 0.27–4.2)
WBC # BLD: 5.3 K/UL (ref 4.8–10.8)

## 2017-11-23 PROCEDURE — 96372 THER/PROPH/DIAG INJ SC/IM: CPT

## 2017-11-23 PROCEDURE — 6370000000 HC RX 637 (ALT 250 FOR IP): Performed by: INTERNAL MEDICINE

## 2017-11-23 PROCEDURE — 80061 LIPID PANEL: CPT

## 2017-11-23 PROCEDURE — 93880 EXTRACRANIAL BILAT STUDY: CPT

## 2017-11-23 PROCEDURE — 85025 COMPLETE CBC W/AUTO DIFF WBC: CPT

## 2017-11-23 PROCEDURE — G0378 HOSPITAL OBSERVATION PER HR: HCPCS

## 2017-11-23 PROCEDURE — 2580000003 HC RX 258: Performed by: PHYSICIAN ASSISTANT

## 2017-11-23 PROCEDURE — 84443 ASSAY THYROID STIM HORMONE: CPT

## 2017-11-23 PROCEDURE — G8979 MOBILITY GOAL STATUS: HCPCS

## 2017-11-23 PROCEDURE — 80048 BASIC METABOLIC PNL TOTAL CA: CPT

## 2017-11-23 PROCEDURE — 36415 COLL VENOUS BLD VENIPUNCTURE: CPT

## 2017-11-23 PROCEDURE — G8978 MOBILITY CURRENT STATUS: HCPCS

## 2017-11-23 PROCEDURE — 97162 PT EVAL MOD COMPLEX 30 MIN: CPT

## 2017-11-23 PROCEDURE — 97110 THERAPEUTIC EXERCISES: CPT

## 2017-11-23 PROCEDURE — 6370000000 HC RX 637 (ALT 250 FOR IP): Performed by: PHYSICIAN ASSISTANT

## 2017-11-23 PROCEDURE — 6360000002 HC RX W HCPCS: Performed by: PHYSICIAN ASSISTANT

## 2017-11-23 RX ORDER — SULFAMETHOXAZOLE AND TRIMETHOPRIM 800; 160 MG/1; MG/1
1 TABLET ORAL EVERY 12 HOURS SCHEDULED
Status: DISCONTINUED | OUTPATIENT
Start: 2017-11-23 | End: 2017-11-24 | Stop reason: HOSPADM

## 2017-11-23 RX ORDER — LANOLIN ALCOHOL/MO/W.PET/CERES
3 CREAM (GRAM) TOPICAL NIGHTLY PRN
Status: DISCONTINUED | OUTPATIENT
Start: 2017-11-23 | End: 2017-11-24 | Stop reason: HOSPADM

## 2017-11-23 RX ORDER — METOPROLOL TARTRATE 5 MG/5ML
5 INJECTION INTRAVENOUS EVERY 6 HOURS PRN
Status: DISCONTINUED | OUTPATIENT
Start: 2017-11-23 | End: 2017-11-24 | Stop reason: HOSPADM

## 2017-11-23 RX ADMIN — SIMVASTATIN 20 MG: 20 TABLET, FILM COATED ORAL at 20:13

## 2017-11-23 RX ADMIN — HYDROCHLOROTHIAZIDE 12.5 MG: 12.5 CAPSULE ORAL at 20:13

## 2017-11-23 RX ADMIN — LEVOTHYROXINE SODIUM 150 MCG: 150 TABLET ORAL at 05:58

## 2017-11-23 RX ADMIN — HYDRALAZINE HYDROCHLORIDE 25 MG: 25 TABLET, FILM COATED ORAL at 09:29

## 2017-11-23 RX ADMIN — HYDROCHLOROTHIAZIDE 12.5 MG: 12.5 CAPSULE ORAL at 00:29

## 2017-11-23 RX ADMIN — SODIUM CHLORIDE, PRESERVATIVE FREE 10 ML: 5 INJECTION INTRAVENOUS at 20:14

## 2017-11-23 RX ADMIN — MELATONIN TAB 3 MG 3 MG: 3 TAB at 20:13

## 2017-11-23 RX ADMIN — PAROXETINE HYDROCHLORIDE 15 MG: 30 TABLET, FILM COATED ORAL at 12:31

## 2017-11-23 RX ADMIN — SULFAMETHOXAZOLE AND TRIMETHOPRIM 1 TABLET: 800; 160 TABLET ORAL at 20:13

## 2017-11-23 RX ADMIN — DILTIAZEM HYDROCHLORIDE 240 MG: 240 CAPSULE, COATED, EXTENDED RELEASE ORAL at 12:31

## 2017-11-23 RX ADMIN — ASPIRIN 81 MG 81 MG: 81 TABLET ORAL at 09:29

## 2017-11-23 RX ADMIN — HYDROCHLOROTHIAZIDE 12.5 MG: 12.5 CAPSULE ORAL at 09:29

## 2017-11-23 RX ADMIN — HYDRALAZINE HYDROCHLORIDE 25 MG: 25 TABLET, FILM COATED ORAL at 00:29

## 2017-11-23 RX ADMIN — HYDRALAZINE HYDROCHLORIDE 25 MG: 25 TABLET, FILM COATED ORAL at 22:00

## 2017-11-23 RX ADMIN — SODIUM CHLORIDE, PRESERVATIVE FREE 10 ML: 5 INJECTION INTRAVENOUS at 09:29

## 2017-11-23 RX ADMIN — ENOXAPARIN SODIUM 30 MG: 100 INJECTION SUBCUTANEOUS at 09:29

## 2017-11-23 ASSESSMENT — PAIN SCALES - GENERAL: PAINLEVEL_OUTOF10: 0

## 2017-11-23 NOTE — PROGRESS NOTES
MRI/ MRA Brain completed without contrast.  Patient did not want to continue with the exam.  All sequences were completed except post gadolinium

## 2017-11-23 NOTE — FLOWSHEET NOTE
Patient has returned from ultrasound of carotids. Remains forgetful and confused. Is oriented to person and occasionally to place. Remains incontinent of urine but did use bedpan.

## 2017-11-23 NOTE — H&P
left, for colon cancer    HEMICOLECTOMY      right, for villous adenoma with foci of adenocarcinoma    HYSTERECTOMY      TEOFILO AND BSO         Medications Prior to Admission:      Prior to Admission medications    Medication Sig Start Date End Date Taking?  Authorizing Provider   ciprofloxacin (CIPRO) 250 MG tablet Take 250 mg by mouth 2 times daily 11/20/17 11/25/17 Yes Historical Provider, MD   melatonin 3 MG TABS tablet Take 3 mg by mouth nightly as needed    Historical Provider, MD   potassium chloride (MICRO-K) 10 MEQ extended release capsule Take 40 mEq by mouth daily    Historical Provider, MD   vitamin D (ERGOCALCIFEROL) 73200 units CAPS capsule Take 50,000 Units by mouth once a week 8/16/17 10/11/17  Historical Provider, MD   Cholecalciferol (VITAMIN D) 2000 units CAPS capsule Take 1 capsule by mouth daily 10/11/17   Historical Provider, MD   acetaminophen (TYLENOL) 325 MG tablet Take 650 mg by mouth every 4 hours as needed for Pain    Historical Provider, MD   albuterol (ACCUNEB) 0.63 MG/3ML nebulizer solution Take 1 ampule by nebulization every 4 hours as needed for Wheezing    Historical Provider, MD   aspirin 81 MG tablet Take 81 mg by mouth daily    Historical Provider, MD   cyanocobalamin 1000 MCG/ML injection Inject 1,000 mcg into the muscle once THE 12TH OF EVERY MONTH    Historical Provider, MD   diltiazem (CARDIZEM CD) 240 MG extended release capsule Take 240 mg by mouth 2 times daily    Historical Provider, MD   hydrALAZINE (APRESOLINE) 25 MG tablet Take 25 mg by mouth 2 times daily    Historical Provider, MD   hydrochlorothiazide (HYDRODIURIL) 25 MG tablet Take 12.5 mg by mouth daily     Historical Provider, MD   Multiple Vitamin (MULTIVITAMINS PO) Take by mouth every morning    Historical Provider, MD   PARoxetine (PAXIL) 10 MG tablet Take 15 mg by mouth every morning    Historical Provider, MD   levothyroxine (SYNTHROID) 150 MCG tablet Take 150 mcg by mouth Daily    Historical Provider, MD Ascorbic Acid (VITAMIN C) 500 MG tablet Take 500 mg by mouth daily    Historical Provider, MD       Allergies:  Erythromycin and Tetracyclines & related    Social History:      The patient currently lives home    TOBACCO:   reports that she quit smoking about 36 years ago. Her smoking use included Cigarettes. She smoked 0.30 packs per day. She has never used smokeless tobacco.  ETOH:   reports that she does not drink alcohol. Family History:       Positive as follows:cad    History reviewed. No pertinent family history. REVIEW OF SYSTEMS:   Pertinent positives as noted in the HPI. All other systems reviewed and negative. PHYSICAL EXAM:    BP (!) 156/70   Pulse 85   Temp 98.2 °F (36.8 °C) (Oral)   Resp 14   Ht 5' 2\" (1.575 m)   Wt 140 lb (63.5 kg)   SpO2 98%   BMI 25.61 kg/m²     General appearance:  No apparent distress, appears stated age and cooperative. HEENT:  Normal cephalic, atraumatic without obvious deformity. Pupils equal, round, and reactive to light. Extra ocular muscles intact. Conjunctivae/corneas clear. Neck: Supple, with full range of motion. No jugular venous distention. Trachea midline. Respiratory:  Normal respiratory effort. Clear to auscultation, bilaterally without Rales/Wheezes/Rhonchi. Cardiovascular:  Regular rate and rhythm with normal S1/S2 without murmurs, rubs or gallops. Abdomen: Soft, non-tender, non-distended with normal bowel sounds. Musculoskeletal:  No clubbing, cyanosis or edema bilaterally. Full range of motion without deformity. Skin: Skin color, texture, turgor normal.  No rashes or lesions. Neurologic:  Neurovascularly intact without any focal sensory/motor deficits.  Cranial nerves: II-XII intact, grossly non-focal.  Psychiatric:  Alert and oriented x's 2  Capillary Refill: Brisk,< 3 seconds   Peripheral Pulses: +2 palpable, equal bilaterally       Labs:     Recent Labs      11/22/17   1515   WBC  6.9   HGB  11.7*   HCT  36.4*   PLT  215     Recent Labs      11/22/17   1515   NA  142   K  5.2*   CL  105   CO2  23   BUN  24*   CREATININE  1.19*   CALCIUM  9.4     Recent Labs      11/22/17   1515   AST  17   ALT  11   BILITOT  0.2   ALKPHOS  87     No results for input(s): INR in the last 72 hours. No results for input(s): Zulemaa Real in the last 72 hours. Urinalysis:      Lab Results   Component Value Date    NITRU Negative 11/22/2017    WBCUA 3-5 11/22/2017    BACTERIA Rare 11/22/2017    RBCUA 0-2 11/22/2017    BLOODU Negative 11/22/2017    SPECGRAV 1.009 11/22/2017    GLUCOSEU Negative 11/22/2017    GLUCOSEU NEG 04/25/2012       Radiology:     CXR: I have reviewed the CXR with the following interpretation: pending  EKG:  I have reviewed the EKG with the following interpretation: pending    CT Head WO Contrast   Final Result   NO ACUTE INTRACRANIAL PATHOLOGY. ASSESSMENT:    Active Hospital Problems    Diagnosis Date Noted    TIA (transient ischemic attack) [G45.9] 11/22/2017       PLAN:        DVT Prophylaxis: lovenox if ct of head negative  Diet:    Code Status: Prior    PT/OT Eval Status: eval and tx    1. Hypertensive urgency-will admit and consult [facility]. Prn bp med  2. TIA-will consult Dr. Chloé Garcia. Obtain a mri/mra of brain, carotid us and 2D Echo  3. UTI-will medicate with iv ceftriaxone  4. ARF-hydrate with ivf       BELEN Renner    Thank you Yovany Shine for the opportunity to be involved in this patient's care. If you have any questions or concerns please feel free to contact me. Attending's Note:  Pt was seen and evaluated and discussed care with PA. I agree with the above assessment and plan.     Pily Ga, [address] [facility]

## 2017-11-23 NOTE — CONSULTS
without any specific complaints. Her blood pressures since arrival to the hospital have been in the 130s to 150s on her usual home medications. Allergies: Allergies   Allergen Reactions    Erythromycin     Tetracyclines & Related          Past Medical History:  Past Medical History:   Diagnosis Date    [facility]     Cataract extraction status of left eye     Cataract extraction status of right eye     Colon cancer ([facility] [address] [facility]     Essential hypertension, benign 9/9/2011    H/O colonoscopy 6/6/2012    [place]    History of non anemic vitamin B12 deficiency 1/11/2012    Leukopenia 9/9/2011    Osteoarthritis of right hip 1/20/2014    Overactive bladder 11/20/2013    Primary hypothyroidism 9/9/2011    S/P bilateral breast reduction     S/P cystoscopy 2009    surveilance for Hx of resected Bladder cancer Dr Carly Bowden S/P cystoscopy 2010    susurveilance for Hx of resected Bladder cancer Dr Carly Bowden S/P left colectomy     for colon cancer    S/P right colectomy     for villous adenoma with foci of adenocarcinoma    S/P total abdominal hysterectomy and bilateral salpingo-oophorectomy     Urinary bladder cancer ([facility] [address]     Resection of noninvasive bladder cancer    Urinary incontinence 11/20/2013    Weight loss, unintentional 7/23/2013       Past Surgical History:  Past Surgical History:   Procedure Laterality Date    BLADDER TUMOR EXCISION      Resection of noninvasive bladder cancer    BREAST REDUCTION SURGERY      bilateral    CATARACT REMOVAL WITH IMPLANT      CATARACT REMOVAL WITH IMPLANT      COLON SURGERY      CYSTOSCOPY      susurveilance for Hx of resected Bladder cancer Dr Saira Lowry for Hx of resected Bladder cancer Dr Monika Greene      left, for colon cancer    HEMICOLECTOMY      right, for villous adenoma with foci of adenocarcinoma    HYSTERECTOMY      TEOFILO AND BSO           Family History:   History reviewed.  No pertinent family history. Social  History:     Social History   Substance Use Topics    Smoking status: Former Smoker     Packs/day: 0.30     Types: Cigarettes     Quit date: 1/1/1981    Smokeless tobacco: Never Used    Alcohol use No     The patient currently lives home     TOBACCO:   reports that she quit smoking about 36 years ago. Her smoking use included Cigarettes. She smoked 0.30 packs per day. She has never used smokeless tobacco.  ETOH:   reports that she does not drink alcohol. Home Medications:    Prior to Admission medications    Medication Sig Start Date End Date Taking?  Authorizing Provider   ciprofloxacin (CIPRO) 250 MG tablet Take 250 mg by mouth 2 times daily 11/20/17 11/25/17  Historical Provider, MD   melatonin 3 MG TABS tablet Take 3 mg by mouth nightly as needed    Historical Provider, MD   potassium chloride (MICRO-K) 10 MEQ extended release capsule Take 40 mEq by mouth daily    Historical Provider, MD   vitamin D (ERGOCALCIFEROL) 31579 units CAPS capsule Take 50,000 Units by mouth once a week 8/16/17 10/11/17  Historical Provider, MD   Cholecalciferol (VITAMIN D) 2000 units CAPS capsule Take 1 capsule by mouth daily 10/11/17   Historical Provider, MD   acetaminophen (TYLENOL) 325 MG tablet Take 650 mg by mouth every 4 hours as needed for Pain    Historical Provider, MD   aspirin 81 MG tablet Take 81 mg by mouth daily    Historical Provider, MD   cyanocobalamin 1000 MCG/ML injection Inject 1,000 mcg into the muscle once THE 12TH OF EVERY MONTH    Historical Provider, MD   diltiazem (CARDIZEM CD) 240 MG extended release capsule Take 240 mg by mouth daily     Historical Provider, MD   hydrALAZINE (APRESOLINE) 25 MG tablet Take 25 mg by mouth 2 times daily    Historical Provider, MD   hydrochlorothiazide (HYDRODIURIL) 25 MG tablet Take 12.5 mg by mouth 2 times daily     Historical Provider, MD   Multiple Vitamin (MULTIVITAMINS PO) Take by mouth every morning    Historical Provider, MD   PARoxetine (PAXIL) 10 MG tablet Take 15 mg by mouth every morning    Historical Provider, MD   levothyroxine (SYNTHROID) 150 MCG tablet Take 150 mcg by mouth Daily    Historical Provider, MD   Ascorbic Acid (VITAMIN C) 500 MG tablet Take 500 mg by mouth daily    Historical Provider, MD     Current Medications:   sodium chloride 50 mL/hr at 11/22/17 2137     Current Facility-Administered Medications   Medication Dose Route Frequency Provider Last Rate Last Dose    melatonin tablet 3 mg  3 mg Oral Nightly PRN Sylvia Araujo MD        sodium chloride flush 0.9 % injection 10 mL  10 mL Intravenous 2 times per day BELEN Shah   10 mL at 11/22/17 2228    sodium chloride flush 0.9 % injection 10 mL  10 mL Intravenous PRN Grace Palafox        acetaminophen (TYLENOL) tablet 650 mg  650 mg Oral Q4H PRN BELEN Shah        magnesium hydroxide (MILK OF MAGNESIA) 400 MG/5ML suspension 30 mL  30 mL Oral Daily PRN BELEN Palafox        [facility]ron [facility]) injection 4 mg  4 mg Intravenous Q6H PRN BELEN Palafox        enoxaparin (LOVENOX) injection 30 mg  30 mg Subcutaneous Daily Grace Castro        simvastatin (ZOCOR) tablet 20 mg  20 mg Oral Nightly BELEN Palafox   20 mg at 11/22/17 2226    labetalol (NORMODYNE;TRANDATE) injection 10 mg  10 mg Intravenous Q4H PRN Grace Palafox        cefTRIAXone (ROCEPHIN) 1 g in sterile water 10 mL IV syringe  1 g Intravenous Q24H BELEN Shah   1 g at 11/22/17 2226    0.9 % sodium chloride infusion   Intravenous Continuous Grace Shah 50 mL/hr at 11/22/17 2137      aspirin chewable tablet 81 mg  81 mg Oral Daily Sylvia Araujo MD        diltiazem (CARDIZEM CD) extended release capsule 240 mg  240 mg Oral Daily Sylvia Araujo MD        hydrALAZINE (APRESOLINE) tablet 25 mg  25 mg Oral BID Sylvia Araujo MD   25 mg at 11/23/17 0029    hydrochlorothiazide (Cruz Comment) capsule 12.5 mg  12.5 mg Oral BID Jasiel Bishop MD   12.5 mg at 11/23/17 0029    levothyroxine (SYNTHROID) tablet 150 mcg  150 mcg Oral Daily Jasiel Bishop MD   150 mcg at 11/23/17 0558    PARoxetine (PAXIL) tablet 15 mg  15 mg Oral QAM Jasiel Bishop MD        pill splitter   Does not apply Once Jasiel Bishop MD           ROS:   12 point review of systems was obtained in detail and is negative other than that noted above or below. Vital Signs:  Vitals:    11/23/17 0029 11/23/17 0040 11/23/17 0352 11/23/17 0726   BP: (!) 157/76 (!) 161/73 (!) 155/68 (!) 159/71   Pulse: 73 82 74 81   Resp: 16 15 15 15   Temp: 98.4 °F (36.9 °C) 98.2 °F (36.8 °C) 98.4 °F (36.9 °C) 97.7 °F (36.5 °C)   TempSrc: Oral Oral Oral Oral   SpO2: 97% 94% 94% 97%   Weight:       Height:           Intake/Output Summary (Last 24 hours) at 11/23/17 5573  Last data filed at 11/23/17 0554   Gross per 24 hour   Intake              456 ml   Output                0 ml   Net              456 ml     Patient Vitals for the past 96 hrs (Last 3 readings):   Weight   11/22/17 1426 140 lb (63.5 kg)       Physical Examination:  PHYSICAL EXAMINATION:  GENERAL APPEARANCE: Well developed, well nourished, in no acute distress. CHEST: Symmetric and non-tender. INTEGUMENT: Skin warm and dry, without gross excoriationis or lesions. HEENT: No gross abnormalities of conjunctiva, teeth, gums, oral mucosa  NECK: Supple, no JVD, no bruit. Thyroid not palpable. Carotid upstrokes normal.  NEURO/PSHCY: Alert and oriented x3; appropriate behavior and responses and responses, grossly normal cerebellar function with normal balance and coordination  LUNGS: Clear to auscultation bilaterally; normal respiratory effort. HEART: Rate and rhythm regular with 1/6 systolic ejection murmur left sternal border; no gallop appreciated. There are no rubs, clicks or heaves. PMI nondisplaced. ABDOMEN: Soft, nontender, no palpable hepatosplenomegaly, no mases, no bruits. Abdominal aorta not noted to be enlarged. MUSCULOSKELETAL: Ambulatory with normal tandem gait. EXTREMITIES: Warm with good color, no clubbing or cyanois. There is Trivial to 1+ bilateral edema noted. PERIPHERAL VASCULAR: Pulses present and equally palpable; 2+ throughout. No femoral bruits. Diagnostics:    EKG:  Sinus tachycardia  (Underlying baseline artifact, cannot completely exclude atrial flutter)  Right bundle branch block  Inferior infarct (cited on or before 06-JAN-2017)  Anterior infarct , age undetermined  Abnormal ECG  EKG done January 2017 was similar. Probable sinus tachycardia though it was interpreted as atrial fibrillation. Telemetry: normal sinus .       Lab Data:  BMP:  Recent Labs      11/22/17   1515  11/23/17   0613   NA  142  143   K  5.2*  3.8   CL  105  106   CO2  23  24   BUN  24*  17   CREATININE  1.19*  0.99*   LABGLOM  42.0*  51.9*       CBC:   Lab Results   Component Value Date    WBC 5.3 11/23/2017    RBC 3.21 11/23/2017    RBC 4.08 04/25/2012    HGB 9.9 11/23/2017    HCT 29.4 11/23/2017     11/23/2017       CMP:  Lab Results   Component Value Date     11/23/2017    K 3.8 11/23/2017     11/23/2017    CO2 24 11/23/2017    BUN 17 11/23/2017    CREATININE 0.99 11/23/2017    GFRAA >60.0 11/23/2017    LABGLOM 51.9 11/23/2017    GLUCOSE 95 11/23/2017    GLUCOSE 97 04/25/2012    CALCIUM 8.5 11/23/2017       Hepatic Function Panel:  Lab Results   Component Value Date    ALKPHOS 87 11/22/2017    ALT 11 11/22/2017    AST 17 11/22/2017    PROT 7.1 11/22/2017    BILITOT 0.2 11/22/2017    LABALBU 3.5 11/22/2017    LABALBU 4.0 04/25/2012       Magnesium:   Lab Results   Component Value Date    MG 2.0 08/17/2017       PT/INR:    Lab Results   Component Value Date    PROTIME 10.5 10/12/2016    INR 1.0 10/12/2016       Lipid Profile:    Lab Results   Component Value Date    TRIG 63 11/23/2017    HDL 46 11/23/2017    LDLCALC 75 11/23/2017       TSH:    Lab Results Component Value Date    TSH 4.910 02/01/2017         Radiology:     Result Date: 11/22/2017  EXAMINATION: CT HEAD WO CONTRAST  DATE AND TIME:11/22/2017 6:15 PM CLINICAL HISTORY: Severe headache.  cva/bleed/trauma  COMPARISON: December 13, 2005 TECHNIQUE:Axial CT from skull base to vertex without  contrast..  All CT scans at this facility use dose modulation, iterative reconstruction, and/or weight based dosing when appropriate to reduce radiation dose to as low as reasonably achievable. FINDINGS:  Ventricles are normal in size and position. Sulci are appropriate for age                      There are a periventricular hypodensities consistent with normal aging. There is no parenchymal mass, or mass effect, There is no acute parenchymal hemorrhage or extra-axial fluid. The bony calvarium and skull base are normal.   The visualized paranasal sinuses and mastoids are clear. NO ACUTE INTRACRANIAL PATHOLOGY. Impression:   Active Problems:    TIA (transient ischemic attack)    Episode of uncontrolled hypertension. Currently much improved on the same medications she has been taking at home. Multiple recent blood pressures take at home have been in normal range. Suspect this episode was related to the acute neurologic event as opposed to the blood pressure being the specific cause of those symptoms. Stable cardiac status. No angina pectoris. No evidence of congestive heart failure. Recommendations:    1. Continue same home medications for the time being. 2.  PRN IV lopressor. 3.  Neurology evaluation. 4.  Review echocardigram once completed. 5.  Repeat EKG. Monitor for any atrial dysrhythmias. 6.  Empiric antibiotics. 7.  Cautious hydration. Thank you for the opportunity to participate in the care of your patient. Do not hesitate to call if you have any questions.     Electronically signed by Isabel Mcdermott MD, [facility] on 11/23/2017 at 9:07 AM

## 2017-11-23 NOTE — PROGRESS NOTES
cyanosis or edema bilaterally. Skin: Skin color, texture, turgor normal.  Dry lips  Neuro: Non Focal. Intact and symmetric  Psychiatric: Alert and oriented, thought content appropriate, normal insight  Capillary Refill: Brisk,< 3 seconds   Peripheral Pulses: +2 palpable, equal bilaterally     Labs:   Recent Labs      11/22/17   1515  11/23/17   0613   WBC  6.9  5.3   HGB  11.7*  9.9*   HCT  36.4*  29.4*   PLT  215  170     Recent Labs      11/22/17   1515  11/23/17   0613   NA  142  143   K  5.2*  3.8   CL  105  106   CO2  23  24   BUN  24*  17   CREATININE  1.19*  0.99*   CALCIUM  9.4  8.5*     Recent Labs      11/22/17   1515   AST  17   ALT  11   BILITOT  0.2   ALKPHOS  87     No results for input(s): INR in the last 72 hours. No results for input(s): [place] Pitch in the last 72 hours. Urinalysis:    Lab Results   Component Value Date    NITRU Negative 11/22/2017    WBCUA 3-5 11/22/2017    BACTERIA Rare 11/22/2017    RBCUA 0-2 11/22/2017    BLOODU Negative 11/22/2017    SPECGRAV 1.009 11/22/2017    GLUCOSEU Negative 11/22/2017    GLUCOSEU NEG 04/25/2012       Radiology:  CT Head WO Contrast   Final Result   NO ACUTE INTRACRANIAL PATHOLOGY. MRA head w/o contrast    (Results Pending)   US CAROTID ARTERY BILATERAL    (Results Pending)   MRI BRAIN WO CONTRAST    (Results Pending)     Assessment/Plan:  79 y/o F with a PMHx of HTN, hypothyroidism, CKD stage 3 who presents with AMS. Per the daughter the patient was not acting herself; she has improved.     #AMS     - Based on the ED assessment the patient may have had a ?TIA; per daughter only symptom was slight confusion     - MR was ordered last night; carotid U/S, echo, neurology were also consulted; will await their recommendations     - Possibly due to a UTI; will also check her TSH; more likely due to hypertensive urgency    #Hypertensive URgency     - Cardiology was consulted; will monitor; PRN IV Lopressor recommended    #UTI     - Was started on treatment Monday morning by daughter because a 'strip' came back positive; phoned a doctor who prescribed Ciprofloxacin     - Onset of confusion was prior to administration of the ciprofloxacin     - Patients urine culture is now negative which may be due to partial treatment; unusual given that the patients previous E Coli was resistant to Cipro     - Will switch from IV CTX to [address] Problems    Diagnosis Date Noted    TIA (transient ischemic attack) [G45.9] 11/22/2017     Additional work up or/and treatment plan may be added today or then after based on clinical progression. I am managing a portion of pt care. Some medical issues are handled by other specialists. Additional work up and treatment should be done in out pt setting by pt PCP and other out pt providers. In addition to examining and evaluating pt, I spent additional time explaining care, normal and abnormal findings, and treatment plan. All of pt questions were answered. Counseling, diet and education were  provided. Case will be discussed with nursing staff when appropriate. Family will be updated if and when appropriate.       Diet: DIET CARDIAC;    Code Status: Full Code - had a long talk with daughter who will discuss this with the POA    PT/OT Eval     Electronically signed by Alonzo Conte MD on 11/23/2017 at 1:18 PM

## 2017-11-23 NOTE — PROGRESS NOTES
Speech Language Pathology      NAME:  lAissa Candelaria  :    DATE: 2017      This patient is currently in Observation/Out-Patient Status. Due to Medicare, other insurance and Hospital Guidelines, a written order with a therapy specific diagnosis (dysphagia, dysarthria, aphasia) must be attached to the order before we can initiate the evaluation. Re-order speech therapy with the appropriate diagnosis, as needed. The original order was placed by BELEN Tuttle on 17 at 21:15. Sticky note and message on Perfect serve were created by this SLP this date at  [facility].     Thank you,  Electronically signed by TRINH Ho on 17 at 7:21 AM

## 2017-11-23 NOTE — CARE COORDINATION
11/23/17 I SPOKE WITH THE PT AND SON QUYNH  WHO IS POA BUT DEFERRED ME BY BOTH TO ALSO SPEAK WITH DTR HARRY. PT IS FROM HOME WITH DTR Luly John. SHE ALSO HAS A SON LATOSHA. SHE USES A WALKER AND HAS A BSC. SHE GETS PASSPORT 5 DAYS/WEEK FROM 08:00- TO NOON. SHE PLANS DISCHARGE HOME THE SAME. SHE HAD CONFUSION AT HOME BUT NOW A/O X 3. I CALLED HARRY AFTER ALSO EXPLAINING TO THE PT AND QUYNH THE OBSERVATION STATUS. I TOLD ALL 3 WE NEEDED PT HOME MEDS HERE AND WHY. QUYNH IS GOING TO GET THEM.

## 2017-11-23 NOTE — PLAN OF CARE
Problem: Falls - Risk of  Goal: Absence of falls  Outcome: Ongoing  Pt remains free from falls, bed in lowest position, wheels locked, non skid footwear worn, hourly rounding completed, call light within reach, pt instructed to use call light.

## 2017-11-23 NOTE — PROGRESS NOTES
least 80 percent but less than 100 percent impaired, limited or restricted  Mobility: Walking and Moving Around Goal Status ():  At least 80 percent but less than 100 percent impaired, limited or restricted    Goals:  Short term goals  Short term goal 1: Pt will sit EOB for 1 min with CGA  Short term goal 2: Pt will complete x10 B LE ther ex sitting EOB with CGA   Short term goal 3: Pt's caregivers will be indep in HEP    Therapy Time:   Individual   Time In 0940   Time Out 1005   Minutes 25   Timed Code Treatment Minutes: Debra Mayes, PT, 11/23/17 at 10:14 AM

## 2017-11-23 NOTE — FLOWSHEET NOTE
Pt arrived at room 289 via stretcher. VS obtained, pt's had episode of urinary incontinence--pt dependant of care to clean up. Call light within reach with instructions on use. Son at bedside to assist with admission questions. Neuro exam WNL including answering all orientation questions correctly but pt has confusion, ie. \"is this my room, I do not remember my son leaving\". 2245-Pt to MRI, checklist completed by daughter Christopher Jimenez. 0020- Pt returned from MRI, VS obtained.

## 2017-11-23 NOTE — ED NOTES
Report called to [facility]. on 2wt     Briana Lorenzo RN  11/22/17 9770

## 2017-11-24 VITALS
HEIGHT: 62 IN | RESPIRATION RATE: 18 BRPM | SYSTOLIC BLOOD PRESSURE: 149 MMHG | HEART RATE: 70 BPM | DIASTOLIC BLOOD PRESSURE: 72 MMHG | WEIGHT: 140 LBS | OXYGEN SATURATION: 94 % | BODY MASS INDEX: 25.76 KG/M2 | TEMPERATURE: 97.5 F

## 2017-11-24 PROBLEM — N39.0 UTI (URINARY TRACT INFECTION): Status: ACTIVE | Noted: 2017-11-24

## 2017-11-24 PROBLEM — I67.4 HYPERTENSIVE ENCEPHALOPATHY: Status: ACTIVE | Noted: 2017-11-24

## 2017-11-24 LAB
LV EF: 70 %
LVEF MODALITY: NORMAL
URINE CULTURE, ROUTINE: NORMAL

## 2017-11-24 PROCEDURE — 6370000000 HC RX 637 (ALT 250 FOR IP): Performed by: INTERNAL MEDICINE

## 2017-11-24 PROCEDURE — 97112 NEUROMUSCULAR REEDUCATION: CPT

## 2017-11-24 PROCEDURE — G0378 HOSPITAL OBSERVATION PER HR: HCPCS

## 2017-11-24 PROCEDURE — 6360000002 HC RX W HCPCS: Performed by: PHYSICIAN ASSISTANT

## 2017-11-24 PROCEDURE — 93005 ELECTROCARDIOGRAM TRACING: CPT

## 2017-11-24 PROCEDURE — 96372 THER/PROPH/DIAG INJ SC/IM: CPT

## 2017-11-24 PROCEDURE — 97535 SELF CARE MNGMENT TRAINING: CPT

## 2017-11-24 PROCEDURE — 2580000003 HC RX 258: Performed by: PHYSICIAN ASSISTANT

## 2017-11-24 PROCEDURE — 93306 TTE W/DOPPLER COMPLETE: CPT

## 2017-11-24 RX ORDER — SULFAMETHOXAZOLE AND TRIMETHOPRIM 800; 160 MG/1; MG/1
1 TABLET ORAL EVERY 12 HOURS SCHEDULED
Qty: 20 TABLET | Refills: 0 | Status: SHIPPED | OUTPATIENT
Start: 2017-11-24 | End: 2017-12-04

## 2017-11-24 RX ORDER — HYDRALAZINE HYDROCHLORIDE 50 MG/1
50 TABLET, FILM COATED ORAL 2 TIMES DAILY
Qty: 90 TABLET | Refills: 3 | Status: SHIPPED | OUTPATIENT
Start: 2017-11-24

## 2017-11-24 RX ORDER — HYDRALAZINE HYDROCHLORIDE 50 MG/1
50 TABLET, FILM COATED ORAL 2 TIMES DAILY
Status: DISCONTINUED | OUTPATIENT
Start: 2017-11-24 | End: 2017-11-24 | Stop reason: HOSPADM

## 2017-11-24 RX ADMIN — DILTIAZEM HYDROCHLORIDE 240 MG: 240 CAPSULE, COATED, EXTENDED RELEASE ORAL at 09:16

## 2017-11-24 RX ADMIN — HYDROCHLOROTHIAZIDE 12.5 MG: 12.5 CAPSULE ORAL at 09:16

## 2017-11-24 RX ADMIN — LEVOTHYROXINE SODIUM 150 MCG: 150 TABLET ORAL at 05:43

## 2017-11-24 RX ADMIN — SODIUM CHLORIDE, PRESERVATIVE FREE 10 ML: 5 INJECTION INTRAVENOUS at 09:16

## 2017-11-24 RX ADMIN — PAROXETINE HYDROCHLORIDE 15 MG: 30 TABLET, FILM COATED ORAL at 09:16

## 2017-11-24 RX ADMIN — HYDRALAZINE HYDROCHLORIDE 25 MG: 25 TABLET, FILM COATED ORAL at 09:16

## 2017-11-24 RX ADMIN — SULFAMETHOXAZOLE AND TRIMETHOPRIM 1 TABLET: 800; 160 TABLET ORAL at 09:16

## 2017-11-24 RX ADMIN — ASPIRIN 81 MG 81 MG: 81 TABLET ORAL at 09:15

## 2017-11-24 RX ADMIN — ENOXAPARIN SODIUM 30 MG: 100 INJECTION SUBCUTANEOUS at 09:16

## 2017-11-24 ASSESSMENT — ENCOUNTER SYMPTOMS: SHORTNESS OF BREATH: 0

## 2017-11-24 ASSESSMENT — PAIN SCALES - GENERAL
PAINLEVEL_OUTOF10: 0
PAINLEVEL_OUTOF10: 0

## 2017-11-24 NOTE — DISCHARGE SUMMARY
appropriate, normal insight  Capillary Refill: Brisk,< 3 seconds   Peripheral Pulses: +2 palpable, equal bilaterally     Patient was seen by the following consultants while admitted to [facility]:   Consults:  Johnathan Rachel HOSPITALIST  IP CONSULT TO NEUROLOGY  IP CONSULT TO CARDIOLOGY    Significant Diagnostic Studies:    Ct Head Wo Contrast    Result Date: 11/22/2017  EXAMINATION: CT HEAD WO CONTRAST  DATE AND TIME:11/22/2017 6:15 PM CLINICAL HISTORY: Severe headache.  cva/bleed/trauma  COMPARISON: December 13, 2005 TECHNIQUE:Axial CT from skull base to vertex without  contrast..  All CT scans at this facility use dose modulation, iterative reconstruction, and/or weight based dosing when appropriate to reduce radiation dose to as low as reasonably achievable. FINDINGS:  Ventricles are normal in size and position. Sulci are appropriate for age                      There are a periventricular hypodensities consistent with normal aging. There is no parenchymal mass, or mass effect, There is no acute parenchymal hemorrhage or extra-axial fluid. The bony calvarium and skull base are normal.   The visualized paranasal sinuses and mastoids are clear. NO ACUTE INTRACRANIAL PATHOLOGY. Mra Head W/o Contrast    Result Date: 11/24/2017  HEAD MRI/MRA: 11/23/2017 CLINICAL HISTORY:  STROKE . Elevated blood pressure, headaches, confusion, urinary frequency. COMPARISON: Head CT from earlier 11/22/2017. TECHNIQUE: Multiplanar MR imaging of the head was performed without contrast. Three-dimensional MRA of the intracranial arterial circulation was performed, with routine and volume rendered images obtained on a 3-dimensional workstation. HEAD MRI FINDINGS: There is no infarct, intracranial hemorrhage, mass effect, midline shift, extra-axial collection, or hydrocephalus.   Mild generalized cerebral atrophy and mild to moderate predominantly supratentorial white matter changes are present, likely small vessel both vertebral arteries. The peak systolic velocity in the right external carotid artery is 106cm/s and the peak systolic velocity in the left external carotid artery is 81cm/s. MINIMAL PLAQUE AT THE CAROTID BIFURCATIONS. ESTIMATED RIGHT INTERNAL CAROTID ARTERY STENOSIS IS LESS THAN 50% AND ESTIMATED LEFT INTERNAL CAROTID ARTERY STENOSIS IS LESS THAN 50%. Validated velocity measurements with angiographic measurements and velocity criteria are extrapolated from diameter data as defined by the Society of Radiologist in [facility] [address]. Radiology 2003; 434;968-130.       Discharge Medications:       [place] Medication Instructions XTR:[id number]    Printed on:11/24/17 1433   Medication Information                      acetaminophen (TYLENOL) 325 MG tablet  Take 650 mg by mouth every 4 hours as needed for Pain             Ascorbic Acid (VITAMIN C) 500 MG tablet  Take 500 mg by mouth daily             aspirin 81 MG tablet  Take 81 mg by mouth daily             Cholecalciferol (VITAMIN D) 2000 units CAPS capsule  Take 1 capsule by mouth daily             cyanocobalamin 1000 MCG/ML injection  Inject 1,000 mcg into the muscle once THE 12TH OF EVERY MONTH             diltiazem (CARDIZEM CD) 240 MG extended release capsule  Take 240 mg by mouth daily              hydrALAZINE (APRESOLINE) 50 MG tablet  Take 1 tablet by mouth 2 times daily             hydrochlorothiazide (HYDRODIURIL) 25 MG tablet  Take 12.5 mg by mouth 2 times daily              levothyroxine (SYNTHROID) 150 MCG tablet  Take 150 mcg by mouth Daily             melatonin 3 MG TABS tablet  Take 3 mg by mouth nightly as needed             Multiple Vitamin (MULTIVITAMINS PO)  Take by mouth every morning             PARoxetine (PAXIL) 10 MG tablet  Take 15 mg by mouth every morning             potassium chloride (MICRO-K) 10 MEQ extended release capsule  Take 40 mEq by mouth daily             sulfamethoxazole-trimethoprim (BACTRIM

## 2017-11-24 NOTE — FLOWSHEET NOTE
Discussed discharge paperwork and medications with son CHELSEA and daughter Stanislaw Smith who takes care of the patient at home. Verbalized understanding with teach back. IV discontinued, telemetry discontinued, [facility] transport scheduled per family patient needs cot because she is non-ambulatory.

## 2017-11-24 NOTE — PROGRESS NOTES
near-syncope. She currently is resting comfortably without any specific complaints. Her blood pressures since arrival to the hospital have been in the 130s to 150s on her usual home medications. VITALS   BP (!) 149/72   Pulse 70   Temp 97.5 °F (36.4 °C) (Tympanic)   Resp 18   Ht 5' 2\" (1.575 m)   Wt 140 lb (63.5 kg)   SpO2 94%   BMI 25.61 kg/m²    MAXIMUM TEMPERATURE OVER 24HRS:  Temp (24hrs), Av.2 °F (36.8 °C), Min:97.5 °F (36.4 °C), Max:98.6 °F (37 °C)    24HR BLOOD PRESSURE RANGE:  Systolic (58BPJ), URR:721 , Min:138 , LWQ:190   ; Diastolic (02AZD), LQJ:88, Min:72, Max:89      Intake/Output Summary (Last 24 hours) at 17 1106  Last data filed at 17 5887   Gross per 24 hour   Intake              990 ml   Output              300 ml   Net              690 ml     Patient Vitals for the past 96 hrs (Last 3 readings):   Weight   17 1426 140 lb (63.5 kg)         REVIEW OF SYSTEMS   Review of Systems   Respiratory: Negative for shortness of breath. Cardiovascular: Negative for chest pain. PHYSICAL EXAM   Objective:  General Appearance:  Comfortable. Vital signs: (most recent): Blood pressure (!) 149/72, pulse 70, temperature 97.5 °F (36.4 °C), temperature source Tympanic, resp. rate 18, height 5' 2\" (1.575 m), weight 140 lb (63.5 kg), SpO2 94 %. Output: Producing urine. HEENT: Normal HEENT exam.    Lungs:  Normal effort and normal respiratory rate. Breath sounds clear to auscultation. She is not in respiratory distress. No rales, wheezes or rhonchi. Heart: Normal rate. Regular rhythm. S1 normal and S2 normal.  Positive for murmur (II/VI LOU LSB). No gallop or friction rub. Chest: No chest wall tenderness. Abdomen: Abdomen is soft. Bowel sounds are normal.   There is no abdominal tenderness. Extremities: Normal range of motion. There is no dependent edema or local swelling. Pulses: Distal pulses are intact.     Neurological: Patient is alert and oriented to person, place and time. Skin:  Warm and dry. No rash. DIAGNOSTIC RESULTS   EKG:   Sinus rhythm with 1st degree AV block with occasional premature ventricular complexes  Left ventricular hypertrophy with QRS widening  Inferior infarct , age undetermined  Abnormal ECG  No previous ECGs available    Telemetry: normal sinus . Echocardiogram: Discrete upper septal thickening. Normal LV systolic function. Mild TR with estimated RVSP of 45 mm Hg.         LAB DATA   BMP:  Recent Labs      11/22/17   1515  11/23/17   0613   NA  142  143   K  5.2*  3.8   CL  105  106   CO2  23  24   BUN  24*  17   CREATININE  1.19*  0.99*   LABGLOM  42.0*  51.9*       CBC:   Lab Results   Component Value Date    WBC 5.3 11/23/2017    RBC 3.21 11/23/2017    RBC 4.08 04/25/2012    HGB 9.9 11/23/2017    HCT 29.4 11/23/2017     11/23/2017       CMP:  Lab Results   Component Value Date     11/23/2017    K 3.8 11/23/2017     11/23/2017    CO2 24 11/23/2017    BUN 17 11/23/2017    CREATININE 0.99 11/23/2017    GFRAA >60.0 11/23/2017    LABGLOM 51.9 11/23/2017    GLUCOSE 95 11/23/2017    GLUCOSE 97 04/25/2012    CALCIUM 8.5 11/23/2017       Hepatic Function Panel:  Lab Results   Component Value Date    ALKPHOS 87 11/22/2017    ALT 11 11/22/2017    AST 17 11/22/2017    PROT 7.1 11/22/2017    BILITOT 0.2 11/22/2017    LABALBU 3.5 11/22/2017    LABALBU 4.0 04/25/2012       Magnesium:    Lab Results   Component Value Date    MG 2.0 08/17/2017       PT/INR:    Lab Results   Component Value Date    PROTIME 10.5 10/12/2016    INR 1.0 10/12/2016         Lipid Profile:  Lab Results   Component Value Date    TRIG 63 11/23/2017    HDL 46 11/23/2017    LDLCALC 75 11/23/2017       TSH:    Lab Results   Component Value Date    TSH 8.040 11/23/2017         RAD:     Result Date: 11/22/2017  EXAMINATION: CT HEAD WO CONTRAST  DATE AND TIME:11/22/2017 6:15 PM CLINICAL HISTORY: Severe headache.  cva/bleed/trauma

## 2017-11-24 NOTE — CONSULTS
Resection of noninvasive bladder cancer    BREAST REDUCTION SURGERY      bilateral    CATARACT REMOVAL WITH IMPLANT      CATARACT REMOVAL WITH IMPLANT      COLON SURGERY      CYSTOSCOPY      susurveilance for Hx of resected Bladder cancer Dr Osmani Velez for Hx of resected Bladder cancer Dr Catherine Murray      left, for colon cancer    HEMICOLECTOMY      right, for villous adenoma with foci of adenocarcinoma    HYSTERECTOMY      TEOFILO AND BSO         Family History  History reviewed. No pertinent family history. [] Unable to obtain due to ventilated and/ or neurologic status    Social History     Social History    Marital status:      Spouse name: N/A    Number of children: N/A    Years of education: N/A     Occupational History    Not on file.      Social History Main Topics    Smoking status: Former Smoker     Packs/day: 0.30     Types: Cigarettes     Quit date: 1/1/1981    Smokeless tobacco: Never Used    Alcohol use No    Drug use: No    Sexual activity: No     Other Topics Concern    Not on file     Social History Narrative    No narrative on file      [] Unable to obtain due to ventilated and/ or neurologic status      Home Medications:      Prescriptions Prior to Admission: ciprofloxacin (CIPRO) 250 MG tablet, Take 250 mg by mouth 2 times daily  melatonin 3 MG TABS tablet, Take 3 mg by mouth nightly as needed  potassium chloride (MICRO-K) 10 MEQ extended release capsule, Take 40 mEq by mouth daily  vitamin D (ERGOCALCIFEROL) 11601 units CAPS capsule, Take 50,000 Units by mouth once a week  Cholecalciferol (VITAMIN D) 2000 units CAPS capsule, Take 1 capsule by mouth daily  acetaminophen (TYLENOL) 325 MG tablet, Take 650 mg by mouth every 4 hours as needed for Pain  aspirin 81 MG tablet, Take 81 mg by mouth daily  cyanocobalamin 1000 MCG/ML injection, Inject 1,000 mcg into the muscle once THE 12TH OF EVERY MONTH  diltiazem (CARDIZEM CD) 240 MG extended and no longer cooperative with the examination therefore it is limited. She is to be some complete sentences, she can identify all of her family members in the room. Her visual fields are difficult to assess however it seems like she may have some inferior vision loss, but this could be due to inattention. Her extraocular eye movements are full. Her face is symmetric. She is hard of hearing. She has 5 out of 5 strength bilateral upper extremities. In her bilateral lower extremities she is able to lift them off the bed with a poor effort. She endorses normal sensation however this is unreliable. She has difficulty following the finger to nose command    She is nonambulatory. Results/ Medications reviewed 11/23/2017, 8:11 PM     Laboratory, Microbiology, Pathology, Radiology, Cardiology, Medications and Transcriptions reviewed  Scheduled Meds:   sulfamethoxazole-trimethoprim  1 tablet Oral 2 times per day    sodium chloride flush  10 mL Intravenous 2 times per day    enoxaparin  30 mg Subcutaneous Daily    simvastatin  20 mg Oral Nightly    aspirin  81 mg Oral Daily    diltiazem  240 mg Oral Daily    hydrALAZINE  25 mg Oral BID    hydrochlorothiazide  12.5 mg Oral BID    levothyroxine  150 mcg Oral Daily    PARoxetine  15 mg Oral QAM    pill splitter   Does not apply Once     Continuous Infusions:   sodium chloride 50 mL/hr at 11/22/17 2137       Recent Labs      11/22/17   1515  11/23/17   0613   WBC  6.9  5.3   HGB  11.7*  9.9*   HCT  36.4*  29.4*   MCV  93.1  91.6   PLT  215  170     Recent Labs      11/22/17   1515  11/23/17   0613   NA  142  143   K  5.2*  3.8   CL  105  106   CO2  23  24   BUN  24*  17   CREATININE  1.19*  0.99*     Recent Labs      11/22/17   1515   AST  17   ALT  11   BILITOT  0.2   ALKPHOS  87     No results for input(s): LIPASE, AMYLASE in the last 72 hours.   Recent Labs      11/22/17   1515   PROT  7.1     Ct Head Wo Contrast    Result Date: 11/22/2017  EXAMINATION: CT HEAD WO CONTRAST  DATE AND TIME:11/22/2017 6:15 PM CLINICAL HISTORY: Severe headache.  cva/bleed/trauma  COMPARISON: December 13, 2005 TECHNIQUE:Axial CT from skull base to vertex without  contrast..  All CT scans at this facility use dose modulation, iterative reconstruction, and/or weight based dosing when appropriate to reduce radiation dose to as low as reasonably achievable. FINDINGS:  Ventricles are normal in size and position. Sulci are appropriate for age                      There are a periventricular hypodensities consistent with normal aging. There is no parenchymal mass, or mass effect, There is no acute parenchymal hemorrhage or extra-axial fluid. The bony calvarium and skull base are normal.   The visualized paranasal sinuses and mastoids are clear. NO ACUTE INTRACRANIAL PATHOLOGY. Us Carotid Artery Bilateral    Result Date: 11/23/2017  EXAMINATION:  ULTRASOUND CAROTID ARTERIES CLINICAL HISTORY:  Confusion, weakness COMPARISONS:  NONE AVAILABLE TECHNIQUE:  Grayscale, duplex Doppler. Sonographic imaging was performed by a registered sonographer and the images are submitted for interpretation. FINDINGS:  Grayscale images demonstrate minimal plaque at the carotid bifurcations. The peak systolic velocity in the right internal carotid artery is 62 cm/s with an ICA/CCA ratio of 1.1. The peak systolic velocity in the left internal carotid artery is  75 cm/s with an ICA/CCA ratio of 1.1. There is antegrade flow in both vertebral arteries. The peak systolic velocity in the right external carotid artery is 106cm/s and the peak systolic velocity in the left external carotid artery is 81cm/s. MINIMAL PLAQUE AT THE CAROTID BIFURCATIONS. ESTIMATED RIGHT INTERNAL CAROTID ARTERY STENOSIS IS LESS THAN 50% AND ESTIMATED LEFT INTERNAL CAROTID ARTERY STENOSIS IS LESS THAN 50%.  Validated velocity measurements with angiographic measurements and velocity criteria are extrapolated from diameter data as defined by the Society of Radiologist in [address]. Radiology 2003; 882;231-456. Impression:     80year old woman with multifactorial encephalopathy from hypertension and UTI. Plan:   Continue medical treatment of hypertension and urinary tract infection. MRI of the brain was normal  MRA of the blood vessels was normal    Comments: Thank you for allowing us to participate in the care of this patient. Will continue to follow. Please call if questions or concerns arise.     Electronically signed by Nicolette Ramirez MD on 11/23/2017 at 8:11 PM

## 2017-11-24 NOTE — PROGRESS NOTES
Physical Therapy Med Surg Daily Treatment Note  [id number]       NAME: Deanna Bailey  :  (80 y.o.)  MRN: [id number]  CODE STATUS: Full Code    Date of Service: 2017    Patient Diagnosis(es): TIA (transient ischemic attack) [G45.9]   Chief Complaint   Patient presents with    Hypertension    Urinary Tract Infection     Patient Active Problem List    Diagnosis Date Noted    TIA (transient ischemic attack) 2017    CKD (chronic kidney disease) stage 3, GFR 30-59 ml/min 2015    Osteoarthritis of right hip 2014    Overactive bladder 2013    Urinary incontinence 2013    Easy bruising 2013    History of non anemic vitamin B12 deficiency 2012    Essential hypertension, benign 2011    Leukopenia 2011    Primary hypothyroidism 2011        Past Medical History:   Diagnosis Date    [facility]     Cataract extraction status of left eye     Cataract extraction status of right eye     [facility]     Essential hypertension, benign 2011    H/O colonoscopy 2012    [place]    History of non anemic vitamin B12 deficiency 2012    Leukopenia 2011    Osteoarthritis of right hip 2014    Overactive bladder 2013    Primary hypothyroidism 2011    S/P bilateral breast reduction     S/P cystoscopy     surveilance for Hx of resected Bladder cancer Dr Dannie Kayser S/P cystoscopy     susurveilance for Hx of resected Bladder cancer Dr Dannie Kayser S/P left colectomy     for colon cancer    S/P right colectomy     for villous adenoma with foci of adenocarcinoma    S/P total abdominal hysterectomy and bilateral salpingo-oophorectomy     Urinary bladder [facility] ([facility]     Resection of noninvasive bladder cancer    Urinary incontinence 2013    Weight loss, unintentional 2013     Past Surgical History:   Procedure Laterality Date    BLADDER TUMOR EXCISION      Resection of noninvasive bladder cancer    BREAST REDUCTION SURGERY      bilateral    CATARACT REMOVAL WITH IMPLANT      CATARACT REMOVAL WITH IMPLANT      COLON SURGERY      CYSTOSCOPY      susurveilance for Hx of resected Bladder cancer Dr Kiara Lu for Hx of resected Bladder cancer Dr Ayo Miller      left, for colon cancer    HEMICOLECTOMY      right, for villous adenoma with foci of adenocarcinoma    HYSTERECTOMY      TEOFILO AND BSO           Restrictions  Restrictions/Precautions: Fall Risk    Subjective   General  Chart Reviewed: Yes  Family / Caregiver Present: Yes  Pre Treatment Pain Screening  Pain at present: 0  Scale Used: Numeric Score  Intervention List: Patient able to continue with treatment    Pain Screening  Patient Currently in Pain: Denies     Pain Reassessment:   Pain Assessment  Pain Assessment: 0-10  Pain Level: 0       Orientation  Orientation  Overall Orientation Status: Within Functional Limits    Objective   Bed mobility  Supine to Sit: Maximum assistance  Sit to Supine: Maximum assistance  Comment: max encouragement to participate and cues for sequencing     Transfers  Comment: NOP    Ambulation  Ambulation?: No       Balance  Sitting - Static: Fair;-  Sitting - Dynamic: Fair;-  Comments: sat EOB with min A with (R) lean needing Damien and cues to correct.   Pt able to lean forward to drink water  Pt sat EOB ~ 10 minutes  Exercises  Hip Flexion: x 3 with assist and encouragement  Knee Long Arc Quad: x10 with max encouragement  Ankle Pumps: x10 in supine with assist                     Assessment   Activity Tolerance  Activity Tolerance: Patient limited by fatigue;Patient limited by endurance     Discharge Recommendations:  Patient would benefit from continued therapy after discharge    Goals  Short term goals  Short term goal 1: Pt will sit EOB for 1 min with CGA  Short term goal 2: Pt will complete x10 B LE ther ex sitting EOB with CGA   Short term goal 3: Pt's caregivers will be indep in [address]  Times per week: 3-6  Current Treatment Recommendations: Strengthening, Balance Training, Functional Mobility Training, Neuromuscular Re-education, Endurance Training, Home Exercise Program, Safety Education & Training, Patient/Caregiver Education & Training, Equipment Evaluation, Education, & procurement  Plan Comment: cont current POC  Safety Devices  Type of devices: Bed alarm in place, Call light within reach, Left in bed, Nurse notified     Therapy Time   Individual   Time In 1126   Time Out 1152   Minutes 26      Trsfs 10  NM 10  Therex 6       Bhavana Brown PTA, 11/24/17 at 11:52 AM

## 2017-11-24 NOTE — CARE COORDINATION
PATIENT REMAINS IN OBSERVATION STATUS. ANTICIPATE DISCHARGE HOME TODAY WITH DTR AND PASSPORT SERVICES. DR AHUMADA HAS SAID OK TO DISCHARGE. NEED OK BY NEURO. REVIEWED PT NOTE FOR WHICH DTR WAS PRESENT. DTR STATED THIS IS PATIENTS BASELINE LEVEL OF FUNCTION. DISCUSSED WITH PATIENTS NURSE THAT DC HOME IS ANTICIPATED TODAY.

## 2017-11-25 ENCOUNTER — CARE COORDINATION (OUTPATIENT)
Dept: CASE MANAGEMENT | Age: 82
End: 2017-11-25

## 2017-11-25 NOTE — CARE COORDINATION
[facility] Transitions Initial Follow Up Call    Call within 2 business days of discharge: Yes    Patient: Jasmin Puri Patient : 1920   MRN: [id number]  Reason for Admission: -17 ED [facility] Hypertensive Encephalopathy; TIA; UTI; CKD3  Discharge Date: 17 RARS: [facility] Risk Score: 18.75  CMRS: 2  PHP Plan: [address] [place] [address] Dr. ROMERO  PCP: Gill Hill MD     Spoke with: Jacqueline dtrCharline. Reports pt has generalized weakness & some ataxia. Walking with assist. Hydrating and eating better. Pt does have night time urinary incontinence and wears depends. No needs or concerns. Dtr to schedule PCP appt tomorrow. Has [facility] aide service and will be out tomorrow. RAZIA call only. START taking:  sulfamethoxazole-trimethoprim 800-160 MG per tablet (BACTRIM DS;SEPTRA DS)   CHANGE how you take:  hydrALAZINE 50 MG tablet (APRESOLINE)   STOP taking:  albuterol 0.63 MG/3ML nebulizer solution (ACCUNEB)  ciprofloxacin 250 MG tablet (CIPRO)  vitamin D 67188 units Caps capsule (ERGOCALCIFEROL)    Non-face-to-face services provided:  Obtained and reviewed discharge summary and/or continuity of care documents. Inpatient Assessment  Care Transitions 24 Hour Call    Do you have all of your prescriptions and are they filled?:  Yes  Care Transitions Interventions         Follow Up  No future appointments.     Carmela Alpers, RN

## 2017-11-27 LAB
BLOOD CULTURE, ROUTINE: NORMAL
CULTURE, BLOOD 2: NORMAL
EKG ATRIAL RATE: 68 BPM
EKG P AXIS: 108 DEGREES
EKG P-R INTERVAL: 242 MS
EKG Q-T INTERVAL: 432 MS
EKG QRS DURATION: 132 MS
EKG QTC CALCULATION (BAZETT): 459 MS
EKG R AXIS: -24 DEGREES
EKG T AXIS: -28 DEGREES
EKG VENTRICULAR RATE: 68 BPM

## 2017-11-28 NOTE — PROGRESS NOTES
Physical Therapy  Facility/Department: [facility] I250/[id number]  Physical Therapy Discharge      NAME: Hans Camejo    :  (80 y.o.)  MRN: [id number]    Account: [de-identified]  Gender: female      Patient has been discharged from acute care hospital. DC patient from current PT program.      Electronically signed by Baljit Martin PT on 17 at 11:51 AM

## 2017-11-29 LAB
EKG ATRIAL RATE: 76 BPM
EKG P AXIS: 2 DEGREES
EKG P-R INTERVAL: 240 MS
EKG Q-T INTERVAL: 430 MS
EKG QRS DURATION: 126 MS
EKG QTC CALCULATION (BAZETT): 483 MS
EKG R AXIS: -26 DEGREES
EKG T AXIS: -30 DEGREES
EKG VENTRICULAR RATE: 76 BPM

## 2018-02-06 ENCOUNTER — APPOINTMENT (OUTPATIENT)
Dept: CT IMAGING | Age: 83
DRG: 193 | End: 2018-02-06
Payer: MEDICARE

## 2018-02-06 ENCOUNTER — APPOINTMENT (OUTPATIENT)
Dept: GENERAL RADIOLOGY | Age: 83
DRG: 193 | End: 2018-02-06
Payer: MEDICARE

## 2018-02-06 ENCOUNTER — HOSPITAL ENCOUNTER (INPATIENT)
Age: 83
LOS: 8 days | Discharge: HOME HEALTH | DRG: 193 | End: 2018-02-14
Attending: STUDENT IN AN ORGANIZED HEALTH CARE EDUCATION/TRAINING PROGRAM | Admitting: INTERNAL MEDICINE
Payer: MEDICARE

## 2018-02-06 DIAGNOSIS — J11.00 INFLUENZA WITH PNEUMONIA: Primary | ICD-10-CM

## 2018-02-06 DIAGNOSIS — R53.1 GENERAL WEAKNESS: ICD-10-CM

## 2018-02-06 DIAGNOSIS — J90 PLEURAL EFFUSION: ICD-10-CM

## 2018-02-06 PROBLEM — J11.1 INFLUENZA: Status: ACTIVE | Noted: 2018-02-06

## 2018-02-06 LAB
ALBUMIN SERPL-MCNC: 2.6 G/DL (ref 3.9–4.9)
ALP BLD-CCNC: 68 U/L (ref 40–130)
ALT SERPL-CCNC: 9 U/L (ref 0–33)
ANION GAP SERPL CALCULATED.3IONS-SCNC: 13 MEQ/L (ref 7–13)
ANISOCYTOSIS: ABNORMAL
AST SERPL-CCNC: 17 U/L (ref 0–35)
BASOPHILS ABSOLUTE: 0 K/UL (ref 0–0.2)
BASOPHILS RELATIVE PERCENT: 0.4 %
BILIRUB SERPL-MCNC: 0.2 MG/DL (ref 0–1.2)
BUN BLDV-MCNC: 23 MG/DL (ref 8–23)
CALCIUM SERPL-MCNC: 8.6 MG/DL (ref 8.6–10.2)
CHLORIDE BLD-SCNC: 104 MEQ/L (ref 98–107)
CO2: 23 MEQ/L (ref 22–29)
CREAT SERPL-MCNC: 1.01 MG/DL (ref 0.5–0.9)
EOSINOPHILS ABSOLUTE: 0 K/UL (ref 0–0.7)
EOSINOPHILS RELATIVE PERCENT: 0.2 %
GFR AFRICAN AMERICAN: >60
GFR NON-AFRICAN AMERICAN: 50.7
GLOBULIN: 3.2 G/DL (ref 2.3–3.5)
GLUCOSE BLD-MCNC: 93 MG/DL (ref 74–109)
HCT VFR BLD CALC: 34.8 % (ref 37–47)
HEMOGLOBIN: 11.2 G/DL (ref 12–16)
HYPOCHROMIA: ABNORMAL
LACTIC ACID: 1.4 MMOL/L (ref 0.5–2.2)
LYMPHOCYTES ABSOLUTE: 0.5 K/UL (ref 1–4.8)
LYMPHOCYTES RELATIVE PERCENT: 15 %
MCH RBC QN AUTO: 28.9 PG (ref 27–31.3)
MCHC RBC AUTO-ENTMCNC: 32.3 % (ref 33–37)
MCV RBC AUTO: 89.5 FL (ref 82–100)
METAMYELOCYTES RELATIVE PERCENT: 3 %
MONOCYTES ABSOLUTE: 0.2 K/UL (ref 0.2–0.8)
MONOCYTES RELATIVE PERCENT: 6.2 %
NEUTROPHILS ABSOLUTE: 2.5 K/UL (ref 1.4–6.5)
NEUTROPHILS RELATIVE PERCENT: 77 %
PDW BLD-RTO: 16.4 % (ref 11.5–14.5)
PLATELET # BLD: 154 K/UL (ref 130–400)
PLATELET SLIDE REVIEW: ADEQUATE
POTASSIUM SERPL-SCNC: 4.2 MEQ/L (ref 3.5–5.1)
PRO-BNP: 2355 PG/ML
RAPID INFLUENZA  B AGN: NEGATIVE
RAPID INFLUENZA A AGN: POSITIVE
RBC # BLD: 3.88 M/UL (ref 4.2–5.4)
SMUDGE CELLS: 56.8
SODIUM BLD-SCNC: 140 MEQ/L (ref 132–144)
TOTAL PROTEIN: 5.8 G/DL (ref 6.4–8.1)
WBC # BLD: 3.1 K/UL (ref 4.8–10.8)

## 2018-02-06 PROCEDURE — 2580000003 HC RX 258: Performed by: PHYSICIAN ASSISTANT

## 2018-02-06 PROCEDURE — 6370000000 HC RX 637 (ALT 250 FOR IP): Performed by: INTERNAL MEDICINE

## 2018-02-06 PROCEDURE — 94640 AIRWAY INHALATION TREATMENT: CPT

## 2018-02-06 PROCEDURE — 2580000003 HC RX 258: Performed by: INTERNAL MEDICINE

## 2018-02-06 PROCEDURE — 80053 COMPREHEN METABOLIC PANEL: CPT

## 2018-02-06 PROCEDURE — 36415 COLL VENOUS BLD VENIPUNCTURE: CPT

## 2018-02-06 PROCEDURE — 6370000000 HC RX 637 (ALT 250 FOR IP): Performed by: PHYSICIAN ASSISTANT

## 2018-02-06 PROCEDURE — 83605 ASSAY OF LACTIC ACID: CPT

## 2018-02-06 PROCEDURE — 99285 EMERGENCY DEPT VISIT HI MDM: CPT

## 2018-02-06 PROCEDURE — 85025 COMPLETE CBC W/AUTO DIFF WBC: CPT

## 2018-02-06 PROCEDURE — 6360000002 HC RX W HCPCS: Performed by: INTERNAL MEDICINE

## 2018-02-06 PROCEDURE — 83880 ASSAY OF NATRIURETIC PEPTIDE: CPT

## 2018-02-06 PROCEDURE — 71045 X-RAY EXAM CHEST 1 VIEW: CPT

## 2018-02-06 PROCEDURE — 94664 DEMO&/EVAL PT USE INHALER: CPT

## 2018-02-06 PROCEDURE — 86403 PARTICLE AGGLUT ANTBDY SCRN: CPT

## 2018-02-06 PROCEDURE — 1210000000 HC MED SURG R&B

## 2018-02-06 PROCEDURE — 6360000002 HC RX W HCPCS: Performed by: PHYSICIAN ASSISTANT

## 2018-02-06 PROCEDURE — 71250 CT THORAX DX C-: CPT

## 2018-02-06 PROCEDURE — 87040 BLOOD CULTURE FOR BACTERIA: CPT

## 2018-02-06 RX ORDER — DILTIAZEM HYDROCHLORIDE 240 MG/1
240 CAPSULE, COATED, EXTENDED RELEASE ORAL DAILY
Status: DISCONTINUED | OUTPATIENT
Start: 2018-02-07 | End: 2018-02-14 | Stop reason: HOSPADM

## 2018-02-06 RX ORDER — LANOLIN ALCOHOL/MO/W.PET/CERES
3 CREAM (GRAM) TOPICAL DAILY
Status: DISCONTINUED | OUTPATIENT
Start: 2018-02-07 | End: 2018-02-09

## 2018-02-06 RX ORDER — ACETAMINOPHEN 325 MG/1
650 TABLET ORAL EVERY 4 HOURS PRN
Status: DISCONTINUED | OUTPATIENT
Start: 2018-02-06 | End: 2018-02-14 | Stop reason: HOSPADM

## 2018-02-06 RX ORDER — ASPIRIN 81 MG/1
81 TABLET, CHEWABLE ORAL DAILY
Status: DISCONTINUED | OUTPATIENT
Start: 2018-02-07 | End: 2018-02-14 | Stop reason: HOSPADM

## 2018-02-06 RX ORDER — ONDANSETRON 2 MG/ML
4 INJECTION INTRAMUSCULAR; INTRAVENOUS EVERY 6 HOURS PRN
Status: DISCONTINUED | OUTPATIENT
Start: 2018-02-06 | End: 2018-02-14 | Stop reason: HOSPADM

## 2018-02-06 RX ORDER — ALBUTEROL SULFATE 2.5 MG/3ML
2.5 SOLUTION RESPIRATORY (INHALATION)
Status: DISCONTINUED | OUTPATIENT
Start: 2018-02-06 | End: 2018-02-14 | Stop reason: HOSPADM

## 2018-02-06 RX ORDER — PAROXETINE 30 MG/1
15 TABLET, FILM COATED ORAL EVERY MORNING
Status: DISCONTINUED | OUTPATIENT
Start: 2018-02-07 | End: 2018-02-14 | Stop reason: HOSPADM

## 2018-02-06 RX ORDER — SODIUM CHLORIDE 0.9 % (FLUSH) 0.9 %
10 SYRINGE (ML) INJECTION PRN
Status: DISCONTINUED | OUTPATIENT
Start: 2018-02-06 | End: 2018-02-14 | Stop reason: HOSPADM

## 2018-02-06 RX ORDER — RISPERIDONE 0.25 MG/1
0.25 TABLET, FILM COATED ORAL DAILY
COMMUNITY

## 2018-02-06 RX ORDER — HYDROCHLOROTHIAZIDE 12.5 MG/1
12.5 CAPSULE, GELATIN COATED ORAL DAILY
Status: DISCONTINUED | OUTPATIENT
Start: 2018-02-07 | End: 2018-02-14 | Stop reason: HOSPADM

## 2018-02-06 RX ORDER — IPRATROPIUM BROMIDE AND ALBUTEROL SULFATE 2.5; .5 MG/3ML; MG/3ML
1 SOLUTION RESPIRATORY (INHALATION) 4 TIMES DAILY
Status: DISCONTINUED | OUTPATIENT
Start: 2018-02-06 | End: 2018-02-09

## 2018-02-06 RX ORDER — OSELTAMIVIR PHOSPHATE 75 MG/1
75 CAPSULE ORAL ONCE
Status: COMPLETED | OUTPATIENT
Start: 2018-02-06 | End: 2018-02-06

## 2018-02-06 RX ORDER — TRAZODONE HYDROCHLORIDE 50 MG/1
25 TABLET ORAL NIGHTLY
COMMUNITY

## 2018-02-06 RX ORDER — HYDRALAZINE HYDROCHLORIDE 50 MG/1
50 TABLET, FILM COATED ORAL 2 TIMES DAILY
Status: DISCONTINUED | OUTPATIENT
Start: 2018-02-06 | End: 2018-02-14 | Stop reason: HOSPADM

## 2018-02-06 RX ORDER — RISPERIDONE 0.25 MG/1
0.25 TABLET, FILM COATED ORAL DAILY
Status: DISCONTINUED | OUTPATIENT
Start: 2018-02-07 | End: 2018-02-14 | Stop reason: HOSPADM

## 2018-02-06 RX ORDER — IPRATROPIUM BROMIDE AND ALBUTEROL SULFATE 2.5; .5 MG/3ML; MG/3ML
1 SOLUTION RESPIRATORY (INHALATION) EVERY 8 HOURS PRN
Status: DISCONTINUED | OUTPATIENT
Start: 2018-02-06 | End: 2018-02-06

## 2018-02-06 RX ORDER — TRAZODONE HYDROCHLORIDE 50 MG/1
50 TABLET ORAL NIGHTLY
Status: DISCONTINUED | OUTPATIENT
Start: 2018-02-06 | End: 2018-02-10

## 2018-02-06 RX ORDER — OSELTAMIVIR PHOSPHATE 30 MG/1
30 CAPSULE ORAL 2 TIMES DAILY
Status: DISPENSED | OUTPATIENT
Start: 2018-02-06 | End: 2018-02-11

## 2018-02-06 RX ORDER — SODIUM CHLORIDE 0.9 % (FLUSH) 0.9 %
10 SYRINGE (ML) INJECTION EVERY 12 HOURS SCHEDULED
Status: DISCONTINUED | OUTPATIENT
Start: 2018-02-06 | End: 2018-02-14 | Stop reason: HOSPADM

## 2018-02-06 RX ORDER — LEVOTHYROXINE SODIUM 0.15 MG/1
150 TABLET ORAL DAILY
Status: DISCONTINUED | OUTPATIENT
Start: 2018-02-07 | End: 2018-02-14 | Stop reason: HOSPADM

## 2018-02-06 RX ORDER — OSELTAMIVIR PHOSPHATE 75 MG/1
75 CAPSULE ORAL 2 TIMES DAILY
Status: DISCONTINUED | OUTPATIENT
Start: 2018-02-07 | End: 2018-02-06 | Stop reason: DRUGHIGH

## 2018-02-06 RX ORDER — ACETAMINOPHEN 80 MG
TABLET,CHEWABLE ORAL ONCE
Status: COMPLETED | OUTPATIENT
Start: 2018-02-06 | End: 2018-02-07

## 2018-02-06 RX ADMIN — OSELTAMIVIR PHOSPHATE 30 MG: 30 CAPSULE ORAL at 22:10

## 2018-02-06 RX ADMIN — Medication 10 ML: at 22:15

## 2018-02-06 RX ADMIN — HYDRALAZINE HYDROCHLORIDE 50 MG: 50 TABLET, FILM COATED ORAL at 22:09

## 2018-02-06 RX ADMIN — VANCOMYCIN HYDROCHLORIDE 1000 MG: 1 INJECTION, POWDER, LYOPHILIZED, FOR SOLUTION INTRAVENOUS at 22:09

## 2018-02-06 RX ADMIN — TRAZODONE HYDROCHLORIDE 50 MG: 50 TABLET ORAL at 22:10

## 2018-02-06 RX ADMIN — PIPERACILLIN SODIUM AND TAZOBACTAM SODIUM 3.38 G: 3; .375 INJECTION, POWDER, LYOPHILIZED, FOR SOLUTION INTRAVENOUS at 23:39

## 2018-02-06 RX ADMIN — IPRATROPIUM BROMIDE AND ALBUTEROL SULFATE 1 AMPULE: .5; 3 SOLUTION RESPIRATORY (INHALATION) at 21:52

## 2018-02-06 RX ADMIN — OSELTAMIVIR PHOSPHATE 75 MG: 75 CAPSULE ORAL at 16:28

## 2018-02-06 ASSESSMENT — ENCOUNTER SYMPTOMS
WHEEZING: 0
ABDOMINAL PAIN: 0
TROUBLE SWALLOWING: 0
RHINORRHEA: 0
COLOR CHANGE: 0
APNEA: 0
EYE PAIN: 0
DIARRHEA: 0
SHORTNESS OF BREATH: 0
VOMITING: 0
ALLERGIC/IMMUNOLOGIC NEGATIVE: 1
COUGH: 1

## 2018-02-06 NOTE — ED PROVIDER NOTES
[facility] ED  eMERGENCY dEPARTMENT eNCOUnter      Pt Name: Chuck Marrero  MRN: [id number]  Ritagfkeenan 12/11/1920  Date of evaluation: 2/6/2018  Provider: Marielle Mccullough PA-C    CHIEF COMPLAINT       Chief Complaint   Patient presents with    Cough         HISTORY OF PRESENT ILLNESS  (Location/Symptom, Timing/Onset, Context/Setting, Quality, Duration, Modifying Factors, Severity.)   Chuck Marrero is a 80 y.o. female who presents to the emergency department With a cough for duration of one week. Patient's son states that she has had a nonproductive cough for 7 days. Also patient has not slept in over 24 hours. She just states patient has not had any vomiting, diarrhea, fever. Patient's states she is acting normal for her. Patient son states that her appetite is normal.    HPI    Nursing Notes were reviewed and I agree. REVIEW OF SYSTEMS    (2-9 systems for level 4, 10 or more for level 5)     Review of Systems   Constitutional: Negative for diaphoresis and fever. HENT: Negative for hearing loss, rhinorrhea, sneezing and trouble swallowing. Eyes: Negative for pain. Respiratory: Positive for cough. Negative for apnea, shortness of breath and wheezing. Cardiovascular: Negative for chest pain. Gastrointestinal: Negative for abdominal pain, diarrhea and vomiting. Endocrine: Negative. Genitourinary: Negative for hematuria. Musculoskeletal: Negative for neck pain and neck stiffness. Skin: Negative for color change. Allergic/Immunologic: Negative. Neurological: Negative for dizziness and numbness. Hematological: Negative. Psychiatric/Behavioral: Negative. Negative for confusion. All other systems reviewed and are negative. Except as noted above the remainder of the review of systems was reviewed and negative.        PAST MEDICAL HISTORY     Past Medical History:   Diagnosis Date    Bl[facility] ([facility]     Cataract extraction status of left eye     Cataract facility use dose modulation, iterative reconstruction, and/or weight based dosing when appropriate to reduce radiation dose to as low as reasonably achievable. XR CHEST PORTABLE   Final Result   SMALL RIGHT-SIDED PLEURAL EFFUSION. RIGHT BASILAR INFILTRATE/ATELECTASIS. LABS:  Labs Reviewed   RAPID INFLUENZA A/B ANTIGENS - Abnormal; Notable for the following:        Result Value    Rapid Influenza A Ag POSITIVE (*)     All other components within normal limits   COMPREHENSIVE METABOLIC PANEL - Abnormal; Notable for the following:     CREATININE 1.01 (*)     GFR Non- 50.7 (*)     Total Protein 5.8 (*)     Alb 2.6 (*)     All other components within normal limits   CBC WITH AUTO DIFFERENTIAL - Abnormal; Notable for the following:     WBC 3.1 (*)     RBC 3.88 (*)     Hemoglobin 11.2 (*)     Hematocrit 34.8 (*)     MCHC 32.3 (*)     RDW 16.4 (*)     All other components within normal limits   CULTURE BLOOD #1   CULTURE BLOOD #2   LACTIC ACID, PLASMA   BRAIN NATRIURETIC PEPTIDE       All other labs were within normal range or not returned as of this dictation. EMERGENCY DEPARTMENT COURSE and DIFFERENTIAL DIAGNOSIS/MDM:   Vitals:    Vitals:    02/06/18 1456 02/06/18 1645 02/06/18 1725   BP: 103/66 111/75 121/81   Pulse: 92 92 107   Resp:   14   Temp: 97.5 °F (36.4 °C)     SpO2: 96% 99% 98%   Weight: 120 lb (54.4 kg)         REASSESSMENT     ED Course        Patient is a nontoxic 80year-old female who presents to the emergency room with a cough and weakness/fatigue. Patient was found to have a bilateral pneumonia with a positive influenza. Patient will be admitted in the hospital for further evaluation and care. MDM    PROCEDURES:    Procedures      FINAL IMPRESSION      1. Influenza with pneumonia    2. Pleural effusion    3.  General weakness          DISPOSITION/PLAN   DISPOSITION Decision To Admit 02/06/2018 05:36:37 PM      PATIENT REFERRED TO:  No follow-up provider

## 2018-02-07 PROBLEM — J18.9 COMMUNITY ACQUIRED PNEUMONIA: Status: ACTIVE | Noted: 2018-02-07

## 2018-02-07 LAB
ACANTHOCYTES: 0
ANION GAP SERPL CALCULATED.3IONS-SCNC: 11 MEQ/L (ref 7–13)
ANISOCYTOSIS: 0
AUER RODS: 0
BANDED NEUTROPHILS RELATIVE PERCENT: 2 % (ref 5–11)
BASOPHILIC STIPPLING: 0
BASOPHILS ABSOLUTE: 0 K/UL (ref 0–0.2)
BASOPHILS RELATIVE PERCENT: 0.4 %
BUN BLDV-MCNC: 21 MG/DL (ref 8–23)
BURR CELLS: 0
CABOT RINGS: 0
CALCIUM SERPL-MCNC: 8.3 MG/DL (ref 8.6–10.2)
CHLORIDE BLD-SCNC: 103 MEQ/L (ref 98–107)
CO2: 25 MEQ/L (ref 22–29)
CREAT SERPL-MCNC: 1.07 MG/DL (ref 0.5–0.9)
DOHLE BODIES: 0
EKG ATRIAL RATE: 61 BPM
EKG P AXIS: 84 DEGREES
EKG P-R INTERVAL: 244 MS
EKG Q-T INTERVAL: 460 MS
EKG QRS DURATION: 144 MS
EKG QTC CALCULATION (BAZETT): 463 MS
EKG R AXIS: -27 DEGREES
EKG T AXIS: -2 DEGREES
EKG VENTRICULAR RATE: 61 BPM
EOSINOPHILS ABSOLUTE: 0 K/UL (ref 0–0.7)
EOSINOPHILS RELATIVE PERCENT: 0.9 %
FERRITIN: 108 NG/ML (ref 13–150)
FOLATE: >20 NG/ML (ref 7.3–26.1)
GFR AFRICAN AMERICAN: 57.4
GFR NON-AFRICAN AMERICAN: 47.4
GLUCOSE BLD-MCNC: 77 MG/DL (ref 74–109)
HAIRY CELLS: 0
HCT VFR BLD CALC: 30 % (ref 37–47)
HEMOGLOBIN: 9.9 G/DL (ref 12–16)
HOWELL-JOLLY BODIES: 0
HYPERSEGMENTED NEUTROPHILS: 0
HYPOCHROMIA: 0
IRON SATURATION: 40 % (ref 11–46)
IRON: 59 UG/DL (ref 37–145)
LACTIC ACID: 1.1 MMOL/L (ref 0.5–2.2)
LYMPHOCYTES ABSOLUTE: 0.9 K/UL (ref 1–4.8)
LYMPHOCYTES RELATIVE PERCENT: 26 %
MACROCYTES: 0
MAGNESIUM: 2.2 MG/DL (ref 1.7–2.3)
MCH RBC QN AUTO: 29.5 PG (ref 27–31.3)
MCHC RBC AUTO-ENTMCNC: 33.1 % (ref 33–37)
MCV RBC AUTO: 89.1 FL (ref 82–100)
METAMYELOCYTES RELATIVE PERCENT: 2 %
MICROCYTES: 0
MONOCYTES ABSOLUTE: 0.3 K/UL (ref 0.2–0.8)
MONOCYTES RELATIVE PERCENT: 7.6 %
NEUTROPHILS ABSOLUTE: 2.2 K/UL (ref 1.4–6.5)
NEUTROPHILS RELATIVE PERCENT: 63 %
OVALOCYTES: 0
PAPPENHEIMER BODIES: 0
PDW BLD-RTO: 16.4 % (ref 11.5–14.5)
PELGER HUET CELLS: 0 %
PLATELET # BLD: 132 K/UL (ref 130–400)
PLATELET SLIDE REVIEW: ADEQUATE
POIKILOCYTES: 0
POLYCHROMASIA: 0
POTASSIUM REFLEX MAGNESIUM: 3.7 MEQ/L (ref 3.5–5.1)
RBC # BLD: 3.36 M/UL (ref 4.2–5.4)
RBC # BLD: NORMAL 10*6/UL
SCHISTOCYTES: 0
SICKLE CELLS: 0
SMUDGE CELLS: 1.9
SODIUM BLD-SCNC: 139 MEQ/L (ref 132–144)
SPHEROCYTES: 0
STOMATOCYTES: 0
TARGET CELLS: 0
TEAR DROP CELLS: 0
TOTAL IRON BINDING CAPACITY: 146 UG/DL (ref 178–450)
TOXIC GRANULATION: 0
TSH SERPL DL<=0.05 MIU/L-ACNC: 3.8 UIU/ML (ref 0.27–4.2)
VACUOLATED NEUTROPHILS: 0
VITAMIN B-12: 1639 PG/ML (ref 232–1245)
WBC # BLD: 3.3 K/UL (ref 4.8–10.8)

## 2018-02-07 PROCEDURE — 80048 BASIC METABOLIC PNL TOTAL CA: CPT

## 2018-02-07 PROCEDURE — 6360000002 HC RX W HCPCS: Performed by: INTERNAL MEDICINE

## 2018-02-07 PROCEDURE — 83735 ASSAY OF MAGNESIUM: CPT

## 2018-02-07 PROCEDURE — 6370000000 HC RX 637 (ALT 250 FOR IP): Performed by: INTERNAL MEDICINE

## 2018-02-07 PROCEDURE — 1210000000 HC MED SURG R&B

## 2018-02-07 PROCEDURE — 82607 VITAMIN B-12: CPT

## 2018-02-07 PROCEDURE — 82728 ASSAY OF FERRITIN: CPT

## 2018-02-07 PROCEDURE — 93010 ELECTROCARDIOGRAM REPORT: CPT | Performed by: INTERNAL MEDICINE

## 2018-02-07 PROCEDURE — 83605 ASSAY OF LACTIC ACID: CPT

## 2018-02-07 PROCEDURE — 2580000003 HC RX 258: Performed by: INTERNAL MEDICINE

## 2018-02-07 PROCEDURE — 83540 ASSAY OF IRON: CPT

## 2018-02-07 PROCEDURE — 82746 ASSAY OF FOLIC ACID SERUM: CPT

## 2018-02-07 PROCEDURE — 93005 ELECTROCARDIOGRAM TRACING: CPT

## 2018-02-07 PROCEDURE — 36415 COLL VENOUS BLD VENIPUNCTURE: CPT

## 2018-02-07 PROCEDURE — 83550 IRON BINDING TEST: CPT

## 2018-02-07 PROCEDURE — 99223 1ST HOSP IP/OBS HIGH 75: CPT | Performed by: INTERNAL MEDICINE

## 2018-02-07 PROCEDURE — 84443 ASSAY THYROID STIM HORMONE: CPT

## 2018-02-07 PROCEDURE — 94640 AIRWAY INHALATION TREATMENT: CPT

## 2018-02-07 PROCEDURE — 85025 COMPLETE CBC W/AUTO DIFF WBC: CPT

## 2018-02-07 RX ORDER — LEVOFLOXACIN 5 MG/ML
250 INJECTION, SOLUTION INTRAVENOUS EVERY 24 HOURS
Status: DISCONTINUED | OUTPATIENT
Start: 2018-02-08 | End: 2018-02-08

## 2018-02-07 RX ORDER — SODIUM CHLORIDE 9 MG/ML
INJECTION, SOLUTION INTRAVENOUS CONTINUOUS
Status: DISCONTINUED | OUTPATIENT
Start: 2018-02-07 | End: 2018-02-11

## 2018-02-07 RX ORDER — LEVOFLOXACIN 5 MG/ML
500 INJECTION, SOLUTION INTRAVENOUS EVERY 24 HOURS
Status: DISCONTINUED | OUTPATIENT
Start: 2018-02-07 | End: 2018-02-07 | Stop reason: DRUGHIGH

## 2018-02-07 RX ORDER — LEVOFLOXACIN 5 MG/ML
500 INJECTION, SOLUTION INTRAVENOUS ONCE
Status: COMPLETED | OUTPATIENT
Start: 2018-02-07 | End: 2018-02-07

## 2018-02-07 RX ADMIN — ENOXAPARIN SODIUM 30 MG: 30 INJECTION SUBCUTANEOUS at 13:15

## 2018-02-07 RX ADMIN — IPRATROPIUM BROMIDE AND ALBUTEROL SULFATE 1 AMPULE: .5; 3 SOLUTION RESPIRATORY (INHALATION) at 11:09

## 2018-02-07 RX ADMIN — OSELTAMIVIR PHOSPHATE 30 MG: 30 CAPSULE ORAL at 21:27

## 2018-02-07 RX ADMIN — TRAZODONE HYDROCHLORIDE 50 MG: 50 TABLET ORAL at 21:28

## 2018-02-07 RX ADMIN — PAROXETINE HYDROCHLORIDE 15 MG: 30 TABLET, FILM COATED ORAL at 09:56

## 2018-02-07 RX ADMIN — HYDRALAZINE HYDROCHLORIDE 50 MG: 50 TABLET, FILM COATED ORAL at 21:28

## 2018-02-07 RX ADMIN — IPRATROPIUM BROMIDE AND ALBUTEROL SULFATE 1 AMPULE: .5; 3 SOLUTION RESPIRATORY (INHALATION) at 07:37

## 2018-02-07 RX ADMIN — HYDRALAZINE HYDROCHLORIDE 50 MG: 50 TABLET, FILM COATED ORAL at 09:56

## 2018-02-07 RX ADMIN — Medication 10 ML: at 09:57

## 2018-02-07 RX ADMIN — Medication: at 01:12

## 2018-02-07 RX ADMIN — DILTIAZEM HYDROCHLORIDE 240 MG: 240 CAPSULE, COATED, EXTENDED RELEASE ORAL at 09:56

## 2018-02-07 RX ADMIN — IPRATROPIUM BROMIDE AND ALBUTEROL SULFATE 1 AMPULE: .5; 3 SOLUTION RESPIRATORY (INHALATION) at 15:36

## 2018-02-07 RX ADMIN — LEVOFLOXACIN 500 MG: 5 INJECTION, SOLUTION INTRAVENOUS at 13:16

## 2018-02-07 RX ADMIN — RISPERIDONE 0.25 MG: 0.25 TABLET ORAL at 09:56

## 2018-02-07 RX ADMIN — OSELTAMIVIR PHOSPHATE 30 MG: 30 CAPSULE ORAL at 09:56

## 2018-02-07 RX ADMIN — IPRATROPIUM BROMIDE AND ALBUTEROL SULFATE 1 AMPULE: .5; 3 SOLUTION RESPIRATORY (INHALATION) at 20:17

## 2018-02-07 RX ADMIN — VITAMIN D, TAB 1000IU (100/BT) 2000 UNITS: 25 TAB at 09:56

## 2018-02-07 RX ADMIN — MELATONIN TAB 3 MG 3 MG: 3 TAB at 21:27

## 2018-02-07 RX ADMIN — HYDROCHLOROTHIAZIDE 12.5 MG: 12.5 CAPSULE ORAL at 09:56

## 2018-02-07 RX ADMIN — LEVOTHYROXINE SODIUM 150 MCG: 150 TABLET ORAL at 09:56

## 2018-02-07 RX ADMIN — ASPIRIN 81 MG 81 MG: 81 TABLET ORAL at 09:56

## 2018-02-07 RX ADMIN — SODIUM CHLORIDE: 9 INJECTION, SOLUTION INTRAVENOUS at 13:16

## 2018-02-07 ASSESSMENT — PAIN SCALES - GENERAL: PAINLEVEL_OUTOF10: 0

## 2018-02-07 NOTE — PROGRESS NOTES
Pharmacy Dose Adjustment Per Protocol    Carla Tan is a 80 y.o. female. Medication Ordered: zosyn 3.375 g IV every 8 hours    Recent Labs      02/06/18   1637   BUN  23       Recent Labs      02/06/18   1637   CREATININE  1.01*       Estimated Creatinine Clearance: 24 mL/min (based on SCr of 1.01 mg/dL).     Height:   Ht Readings from Last 1 Encounters:   02/06/18 5' 1\" (1.549 m)     Weight:  Wt Readings from Last 1 Encounters:   02/06/18 120 lb (54.4 kg)         Piperacillin/Tazobactam [Zosyn]      Traditional Dosing Change to Extended Infusion:   CrCl ?20 ml/min  Zosyn 2.25 gm q6-8 hr Zosyn 3.375 gm IV q8h, infuse over 4 hr     Zosyn 3.375 gm q6-8 hr      Zosyn 4.5 gm q6-8 hr    CrCl <20 ml/min or ESRD  Zosyn 2.25 gm q6-8 hr Zosyn 3.375 gm IV q12h, infuse over 4 hr     Zosyn 3.375 gm q6-8 hr      Zosyn 4.5 gm q6-8 hr

## 2018-02-07 NOTE — PROGRESS NOTES
Pharmacy Dose Adjustment Per Protocol    Alexander Lee is a 80 y.o. female. Recent Labs      02/06/18   1637   BUN  23       Recent Labs      02/06/18   1637   CREATININE  1.01*       Estimated Creatinine Clearance: 24 mL/min (based on SCr of 1.01 mg/dL). Height:   Ht Readings from Last 1 Encounters:   02/06/18 5' 1\" (1.549 m)     Weight:  Wt Readings from Last 1 Encounters:   02/06/18 120 lb (54.4 kg)         Drug Ordered Therapeutic Interchange (CrCL ?  30 mL/min)    Enoxaparin 40 mg subq daily Enoxaparin 30 mg subq daily    Enoxaparin 1 mg/kg subq BID Enoxaparin ________ (1 mg/kg) subq daily     Enoxaparin 30 subq BID Enoxaparin 30 mg subq daily

## 2018-02-07 NOTE — PROGRESS NOTES
[facility] AT [place] Respiratory Therapy Evaluation   Current Order:  Duoneb Q8 PRN      Home Regimen: No      Ordering Physician: Jj  Re-evaluation Date:  2/9     Diagnosis: Flu      Patient Status: Stable / Unstable + Physician notified    The following MDI Criteria must be met in order to convert aerosol to MDI with spacer. If unable to meet, MDI will be converted to aerosol:  []  Patient able to demonstrate the ability to use MDI effectively  []  Patient alert and cooperative  []  Patient able to take deep breath with 5-10 second hold  []  Medication(s) available in this delivery method   []  Peak flow greater than or equal to 200 ml/min            Current Order Substituted To  (same drug, same frequency)   Aerosol to MDI [] Albuterol Sulfate 0.083% unit dose by aerosol Albuterol Sulfate MDI 2 puffs by inhalation with spacer    [] Levalbuterol 1.25 mg unit dose by aerosol Levalbuterol MDI 2 puffs by inhalation with spacer    [] Levalbuterol 0.63 mg unit dose by aerosol Levalbuterol MDI 2 puffs by inhalation with spacer    [] Ipratropium Bromide 0.02% unit dose by aerosol Ipratropium Bromide MDI 2 puffs by inhalation with spacer    [] Duoneb (Ipratropium + Albuterol) unit dose by aerosol Ipratropium MDI + Albuterol MDI 2 puffs by inhalation w/spacer   MDI to Aerosol [] Albuterol Sulfate MDI Albuterol Sulfate 0.083% unit dose by aerosol    [] Levalbuterol MDI 2 puffs by inhalation Levalbuterol 1.25 mg unit dose by aerosol    [] Ipratropium Bromide MDI by inhalation Ipratropium Bromide 0.02% unit dose by aerosol    [] Combivent (Ipratropium + Albuterol) MDI by inhalation Duoneb (Ipratropium + Albuterol) unit dose by aerosol   Treatment Assessment [Frequency/Schedule]:  Change frequency to: ____Duoneb QID and Albuterol Q2 PRN______________________________________________per Protocol, P&T, MEC      Points 0 1 2 3 4   Pulmonary Status  Non-Smoker  []   Smoking history   < 20 pack years  [x]   Smoking history  ?  20 pack

## 2018-02-07 NOTE — H&P
Hospital Medicine  History and Physical    Patient:  Mina Yung  MRN: [id number]    CHIEF COMPLAINT:    Chief Complaint   Patient presents with    Cough       History Obtained From:  Patient, EMR, ED physician. Primary Care Physician: Omid Donnelly    HISTORY OF PRESENT ILLNESS:   Note: All information obtained from daughter. Patients daughter reports 3 days of increasing fatigue, cough, mucous production. No fevers. No chills. No chest pain. Patient has had good oral intake. Patient is bed bound at baseline. She was given antibiotics outpatient for pneumonia, although family is not sure which antibiotics were prescribed. No nausea/vomiting/diarrhea/constipation. Patient has had difficulty with expectoration, so daughter brought her to the ED. In the emergency department she was found to have acute influenza with right-sided pleural effusion. Started on Tamiflu and admitted for further care. Not able to provide any subjective history due to dementia.     Past Medical History:      Diagnosis Date    [facility] [address]     Cataract extraction status of left eye     Cataract extraction status of right eye     Colon cancer ([facility] [address]     Essential hypertension, benign 9/9/2011    H/O colonoscopy 6/6/2012    [place]    History of non anemic vitamin B12 deficiency 1/11/2012    Leukopenia 9/9/2011    Osteoarthritis of right hip 1/20/2014    Overactive bladder 11/20/2013    Primary hypothyroidism 9/9/2011    S/P bilateral breast reduction     S/P cystoscopy 2009    surveilance for Hx of resected Bladder cancer Dr Jeanie Cohn S/P cystoscopy 2010    susurveilance for Hx of resected Bladder cancer Dr Jeanie Cohn S/P left colectomy     for colon cancer    S/P right colectomy     for villous adenoma with foci of adenocarcinoma    S/P total abdominal hysterectomy and bilateral salpingo-oophorectomy     Urinary bladder cancer ([facility] [address]     Resection of noninvasive bladder cancer    Urinary incontinence 11/20/2013    bleeding    Recent Labs      02/06/18   1637   WBC  3.1*   HGB  11.2*   PLT  154     Recent Labs      02/06/18   1637   NA  140   K  4.2   CL  104   CO2  23   BUN  23   CREATININE  1.01*   GLUCOSE  93   AST  17   ALT  9   BILITOT  0.2   ALKPHOS  68     ---------------------   Ct Chest Wo Contrast    Result Date: 2/6/2018  EXAMINATION:  CT SCAN THE CHEST CLINICAL HISTORY:  Abnormal chest x-ray COMPARISON: TECHNIQUE:  Multiple serial axial images from the base neck through the upper abdomen with both sagittal coronal reconstruction was performed without intravenous or oral administration of contrast. FINDINGS:  There is an area of infiltrate consolidation air bronchograms of the right lower lobe. There is associated moderate effusion which extends into the fissure and account for the appearance on the chest x-ray. There is also small area of atelectasis, infiltrate consolidation left lower lobe. There is a small pleural-based nodule at the superior medial aspect of the right upper lobe, image 9 series 3. It measures approximately 6 to 7 mm. No pneumothoraces. There is aneurysmal dilatation of the ascending arch the aorta measuring approximately 4.2 cm. No significant periaortic, pretracheal, parahilar adenopathy. The field-of-view the kidneys are partially visualized. The 2. Atrophic. Correlate with underlying medical renal function. There is less than a 2 mm nonobstructing calculi of the right kidney. Visualized osseous structures show multilevel degenerative change with bridging osteophytes. 1. THERE ARE BIBASILAR INFILTRATES CONSOLIDATIONS RIGHT GREATER THAN LEFT. THERE IS ASSOCIATED MODERATE RIGHT-SIDED PLEURAL EFFUSION WITH THE PLEURAL EFFUSION TRACKING TO THE FISSURE CAUSING APPEARANCE WITHIN THE CHEST X-RAY ON THE RIGHT. 2. THERE IS ANEURYSMAL DILATATION OF THE ASCENDING ARCH THE AORTA. THE KIDNEYS ARE PARTIALLY VISUALIZED. THEY DO APPEAR ATROPHIC. CORRELATE WITH UNDERLYING MEDICAL RENAL FUNCTION.  THERE IS ALSO A LESS THAN 2 MM NONOBSTRUCTING RIGHT RENAL CALCULI. 4. THERE IS A 7 MM PLEURAL-BASED NODULE AT THE SUPERIOR LATERAL ASPECT OF THE RIGHT UPPER LOBE. WILL NEED FOLLOW-UP. All CT scans at this facility use dose modulation, iterative reconstruction, and/or weight based dosing when appropriate to reduce radiation dose to as low as reasonably achievable. Xr Chest Portable    Result Date: 2/6/2018  EXAMINATION: Portable AP ERECT view of the chest. CLINICAL HISTORY: Cough. COMPARISONS: 8/3/2017 FINDINGS: Normal cardiac silhouette. There are atherosclerotic calcifications in the aortic arch. The right costophrenic angle is blunted secondary to small pleural effusion. There is infiltrate/atelectasis at the right lung base. Left lung is clear. There are moderate degenerative changes in spine. Mild dextroscoliosis dorsal spine. SMALL RIGHT-SIDED PLEURAL EFFUSION. RIGHT BASILAR INFILTRATE/ATELECTASIS. EKG: Ordered    Assessment and Plan   1. Acute influenza complicated by possible concurrent HCAP: Start Tamiflu twice a day, Zosyn/vancomycin. Acapella ordered. Incentive spirometry ordered. Urine antigens ordered. Consult pulmonology for de-escalation of antibiotic coverage. Chest x-ray does show significant right-sided pleural effusion with no clear infiltrates however antibiotic coverage is certainly emergency department will continue the same. 2. Advanced dementia with significant limitations in activities of daily living: Continue supportive care monitor for bedsores and continue repositioning to avoid worsening of stage I sacral wound. Continue trazodone and Risperdal.   3. Normocytic normochromic anemia: Check iron B12 folate  4. Hypothyroid Check TSH. Continue synthroid  5. HTN Continue HCTZ, Hydralazine, Cardizem  6. Chronic leukopenia: monitor. 7. Reported history colon CA, Bladder CA per medical history on EMR: none mentioned by daughter on the phone when trying to obtain more information.  None

## 2018-02-07 NOTE — FLOWSHEET NOTE
0830- Patient In  Bed resting, patient does not respond verbally, did not follow commands. Vitals taken, were normal ranges. Patients depends was wet with urine and changed, patient turned to right side. Noted 2 areas on buttocks of red open skin with black centers. Right hip open area approximate 5x2 cm rectangular like shape. Left hip area is open red with black area noted, aproximate area 7x2cm Both buttock have slow nick. Areas washed with soap and water mepelex applied. 1215- Patient daughter is here whom she lives with. Discussed patients full code status with daughter she stated she will have to talk with her family. Did encourage her to do so. 26- Dr. Mane Sharpe saw patient spoke with daughter.

## 2018-02-07 NOTE — CONSULTS
Consults   Pulmonary Medicine  Consult Note      Reason for consultation: Pneumonia. pleural effusion    Consulting physician: Dr. Fito Bessor:    Patient is 79-year-old females admitted with influenza and pneumonia. All information obtained from daughter. Patient unable to provide any information due to dementia. According to daughters at home she does wake up and talk but she is lying in a bed and keeping her eyes closed and not talking. No fevers. No chills. No chest pain. Patient has had good oral intake. Patient is bed bound at baseline. She was given antibiotics outpatient for pneumonia, although family is not sure which antibiotics were prescribed. No nausea/vomiting/diarrhea/constipation. Patient has had difficulty with expectoration, so daughter brought her to the . In the ER patient was found having influenza, right and left lower lobe pneumonia and right pleural effusion. .      Past Medical History:        Diagnosis Date    Bladder [facility] ([facility] [address]     Cataract extraction status of left eye     Cataract extraction status of right eye     Colon cancer ([facility] [address]     Community acquired pneumonia 2/7/2018    Essential hypertension, benign 9/9/2011    H/O colonoscopy 6/6/2012    [place]    History of non anemic vitamin B12 deficiency 1/11/2012    Leukopenia 9/9/2011    Osteoarthritis of right hip 1/20/2014    Overactive bladder 11/20/2013    Primary hypothyroidism 9/9/2011    S/P bilateral breast reduction     S/P cystoscopy 2009    surveilance for Hx of resected Bladder cancer Dr Jeanie Cohn S/P cystoscopy 2010    susurveilance for Hx of resected Bladder cancer Dr Jeanie Cohn S/P left colectomy     for colon cancer    S/P right colectomy     for villous adenoma with foci of adenocarcinoma    S/P total abdominal hysterectomy and bilateral salpingo-oophorectomy     Urinary bladder cancer ([facility] [address]     Resection of noninvasive bladder cancer    Urinary incontinence 11/20/2013    120 lb (54.4 kg)   SpO2 97%   BMI 22.67 kg/m²   General: Sleepy ,.comfortable in bed, No distress. Head: Atraumatic , Normocephalic   Eyes: PERRL. No sclera icterus. No conjunctival injection. No discharge   ENT: No nasal  discharge. Pharynx clear. Neck:  Trachea midline. No thyromegaly, no JVD, No cervical adenopathy. Chest : Bilaterally symmetrical ,Normal effort,  No accessory muscle use  Lung : Diminished breath sound at bases with few scattered rhonchi at right base. No wheezing. No rhonchi. Heart[de-identified] Normal  rate. Regular rhythm. No mumur ,  Rub or gallop  ABD: Non-tender. Non-distended. No masses. No organmegaly. Normal bowel sounds. No hernia. Extremities: No pitting ,Cyanosis ,No clubbing  Neuro: Sleepy and unable to examine   Skin: Warm and dry. No erythema rash on exposed extremities. Data Review  Recent Labs      02/06/18   1637  02/07/18   0601   WBC  3.1*  3.3*   HGB  11.2*  9.9*   HCT  34.8*  30.0*   PLT  154  132      Recent Labs      02/06/18   1637  02/07/18   0601   NA  140  139   K  4.2  3.7   CL  104  103   CO2  23  25   BUN  23  21   CREATININE  1.01*  1.07*   GLUCOSE  93  77       MV Settings: ABGs: No results for input(s): PHART, WUD8IDA, PO2ART, UPV1CSI, BEART, L6KVSHZN, RGH4FPZ in the last 72 hours. O2 Device: None (Room air)  Lab Results   Component Value Date    LACTA 1.1 02/07/2018    LACTA 1.4 02/06/2018    LACTA 0.9 11/22/2017       Radiology  Ct Chest Wo Contrast    Result Date: 2/6/2018  EXAMINATION:  CT SCAN THE CHEST CLINICAL HISTORY:  Abnormal chest x-ray COMPARISON: TECHNIQUE:  Multiple serial axial images from the base neck through the upper abdomen with both sagittal coronal reconstruction was performed without intravenous or oral administration of contrast. FINDINGS:  There is an area of infiltrate consolidation air bronchograms of the right lower lobe.  There is associated moderate effusion which extends into the fissure and account for the

## 2018-02-08 PROBLEM — E44.0 MODERATE MALNUTRITION (HCC): Status: ACTIVE | Noted: 2018-02-08

## 2018-02-08 LAB
ANION GAP SERPL CALCULATED.3IONS-SCNC: 12 MEQ/L (ref 7–13)
BASOPHILS ABSOLUTE: 0 K/UL (ref 0–0.2)
BASOPHILS RELATIVE PERCENT: 0.2 %
BUN BLDV-MCNC: 20 MG/DL (ref 8–23)
CALCIUM SERPL-MCNC: 8.1 MG/DL (ref 8.6–10.2)
CHLORIDE BLD-SCNC: 109 MEQ/L (ref 98–107)
CO2: 24 MEQ/L (ref 22–29)
CREAT SERPL-MCNC: 1.06 MG/DL (ref 0.5–0.9)
EOSINOPHILS ABSOLUTE: 0 K/UL (ref 0–0.7)
EOSINOPHILS RELATIVE PERCENT: 0.4 %
GFR AFRICAN AMERICAN: 58
GFR NON-AFRICAN AMERICAN: 47.9
GLUCOSE BLD-MCNC: 94 MG/DL (ref 74–109)
HCT VFR BLD CALC: 29.3 % (ref 37–47)
HEMOGLOBIN: 9.7 G/DL (ref 12–16)
LYMPHOCYTES ABSOLUTE: 1 K/UL (ref 1–4.8)
LYMPHOCYTES RELATIVE PERCENT: 18.3 %
MCH RBC QN AUTO: 29.6 PG (ref 27–31.3)
MCHC RBC AUTO-ENTMCNC: 33 % (ref 33–37)
MCV RBC AUTO: 89.8 FL (ref 82–100)
MONOCYTES ABSOLUTE: 0.4 K/UL (ref 0.2–0.8)
MONOCYTES RELATIVE PERCENT: 7.7 %
NEUTROPHILS ABSOLUTE: 4.1 K/UL (ref 1.4–6.5)
NEUTROPHILS RELATIVE PERCENT: 73.4 %
PDW BLD-RTO: 16.6 % (ref 11.5–14.5)
PLATELET # BLD: 121 K/UL (ref 130–400)
POTASSIUM SERPL-SCNC: 3.4 MEQ/L (ref 3.5–5.1)
RBC # BLD: 3.26 M/UL (ref 4.2–5.4)
SODIUM BLD-SCNC: 145 MEQ/L (ref 132–144)
WBC # BLD: 5.6 K/UL (ref 4.8–10.8)

## 2018-02-08 PROCEDURE — 99213 OFFICE O/P EST LOW 20 MIN: CPT

## 2018-02-08 PROCEDURE — 6370000000 HC RX 637 (ALT 250 FOR IP): Performed by: INTERNAL MEDICINE

## 2018-02-08 PROCEDURE — 6360000002 HC RX W HCPCS: Performed by: INTERNAL MEDICINE

## 2018-02-08 PROCEDURE — 85025 COMPLETE CBC W/AUTO DIFF WBC: CPT

## 2018-02-08 PROCEDURE — 99232 SBSQ HOSP IP/OBS MODERATE 35: CPT | Performed by: INTERNAL MEDICINE

## 2018-02-08 PROCEDURE — 94640 AIRWAY INHALATION TREATMENT: CPT

## 2018-02-08 PROCEDURE — 2580000003 HC RX 258: Performed by: INTERNAL MEDICINE

## 2018-02-08 PROCEDURE — 94760 N-INVAS EAR/PLS OXIMETRY 1: CPT

## 2018-02-08 PROCEDURE — 36415 COLL VENOUS BLD VENIPUNCTURE: CPT

## 2018-02-08 PROCEDURE — 1210000000 HC MED SURG R&B

## 2018-02-08 PROCEDURE — APPSS15 APP SPLIT SHARED TIME 0-15 MINUTES: Performed by: PHYSICIAN ASSISTANT

## 2018-02-08 PROCEDURE — 80048 BASIC METABOLIC PNL TOTAL CA: CPT

## 2018-02-08 RX ORDER — LEVOFLOXACIN 5 MG/ML
750 INJECTION, SOLUTION INTRAVENOUS
Status: DISCONTINUED | OUTPATIENT
Start: 2018-02-09 | End: 2018-02-08

## 2018-02-08 RX ORDER — POTASSIUM CHLORIDE 20 MEQ/1
20 TABLET, EXTENDED RELEASE ORAL 2 TIMES DAILY WITH MEALS
Status: COMPLETED | OUTPATIENT
Start: 2018-02-08 | End: 2018-02-08

## 2018-02-08 RX ADMIN — MELATONIN TAB 3 MG 3 MG: 3 TAB at 23:09

## 2018-02-08 RX ADMIN — HYDRALAZINE HYDROCHLORIDE 50 MG: 50 TABLET, FILM COATED ORAL at 09:46

## 2018-02-08 RX ADMIN — TRAZODONE HYDROCHLORIDE 50 MG: 50 TABLET ORAL at 23:06

## 2018-02-08 RX ADMIN — ASPIRIN 81 MG 81 MG: 81 TABLET ORAL at 09:46

## 2018-02-08 RX ADMIN — IPRATROPIUM BROMIDE AND ALBUTEROL SULFATE 1 AMPULE: .5; 3 SOLUTION RESPIRATORY (INHALATION) at 20:40

## 2018-02-08 RX ADMIN — PAROXETINE HYDROCHLORIDE 15 MG: 30 TABLET, FILM COATED ORAL at 09:46

## 2018-02-08 RX ADMIN — LEVOTHYROXINE SODIUM 150 MCG: 150 TABLET ORAL at 05:33

## 2018-02-08 RX ADMIN — IPRATROPIUM BROMIDE AND ALBUTEROL SULFATE 1 AMPULE: .5; 3 SOLUTION RESPIRATORY (INHALATION) at 11:21

## 2018-02-08 RX ADMIN — HYDRALAZINE HYDROCHLORIDE 50 MG: 50 TABLET, FILM COATED ORAL at 23:06

## 2018-02-08 RX ADMIN — ENOXAPARIN SODIUM 30 MG: 30 INJECTION SUBCUTANEOUS at 09:46

## 2018-02-08 RX ADMIN — RISPERIDONE 0.25 MG: 0.25 TABLET ORAL at 09:46

## 2018-02-08 RX ADMIN — HYDROCHLOROTHIAZIDE 12.5 MG: 12.5 CAPSULE ORAL at 09:46

## 2018-02-08 RX ADMIN — OSELTAMIVIR PHOSPHATE 30 MG: 30 CAPSULE ORAL at 09:47

## 2018-02-08 RX ADMIN — IPRATROPIUM BROMIDE AND ALBUTEROL SULFATE 1 AMPULE: .5; 3 SOLUTION RESPIRATORY (INHALATION) at 16:43

## 2018-02-08 RX ADMIN — SODIUM CHLORIDE: 9 INJECTION, SOLUTION INTRAVENOUS at 02:02

## 2018-02-08 RX ADMIN — VITAMIN D, TAB 1000IU (100/BT) 2000 UNITS: 25 TAB at 09:46

## 2018-02-08 RX ADMIN — POTASSIUM CHLORIDE 20 MEQ: 20 TABLET, EXTENDED RELEASE ORAL at 13:37

## 2018-02-08 RX ADMIN — OSELTAMIVIR PHOSPHATE 30 MG: 30 CAPSULE ORAL at 23:06

## 2018-02-08 RX ADMIN — Medication 10 ML: at 23:06

## 2018-02-08 RX ADMIN — DILTIAZEM HYDROCHLORIDE 240 MG: 240 CAPSULE, COATED, EXTENDED RELEASE ORAL at 09:46

## 2018-02-08 RX ADMIN — SODIUM CHLORIDE: 9 INJECTION, SOLUTION INTRAVENOUS at 14:24

## 2018-02-08 RX ADMIN — POTASSIUM CHLORIDE 20 MEQ: 20 TABLET, EXTENDED RELEASE ORAL at 17:42

## 2018-02-08 RX ADMIN — Medication 10 ML: at 09:47

## 2018-02-08 RX ADMIN — IPRATROPIUM BROMIDE AND ALBUTEROL SULFATE 1 AMPULE: .5; 3 SOLUTION RESPIRATORY (INHALATION) at 07:25

## 2018-02-08 RX ADMIN — SODIUM CHLORIDE: 9 INJECTION, SOLUTION INTRAVENOUS at 23:07

## 2018-02-08 NOTE — PROGRESS NOTES
Wound Ostomy Continence Nurse  Consult Note       NAME:  Lisa Hess RECORD NUMBER:  [id number]  AGE: 80 y.o.    GENDER: female  : 1920  TODAY'S DATE:  2018    Subjective   Reason for [id number] Nurse Evaluation and Assessment: Bilateral buttocks wounds      Maria C Mckeon is a 80 y.o. female referred by:   [] Physician  [x] Nursing  [] Other:     Wound Identification:  Wound Type: pressure  Contributing Factors: decreased mobility, shear force, malnutrition, incontinence of stool, incontinence of urine and Cachexia    Wound History: Patient admitted with pressure injury to bilateral buttocks  Current Wound Care Treatment:  Recommending 1) pressure injury prevention internventions 2) Border foam dressing to affected areas, use protective barrier cream with zinc if Mepilex needs changed agnes frequently due to incontinence 3) Incontinence care    Patient Goal of Care:  [x] Wound Healing  [] Odor Control  [] Palliative Care  [] Pain Control   [] Other:         PAST MEDICAL HISTORY        Diagnosis Date    Bl[facility] ([facility] [address]     Cataract extraction status of left eye     Cataract extraction status of right eye     Colon cancer ([facility] [address]     Community acquired pneumonia 2018    Essential hypertension, benign 2011    H/O colonoscopy 2012    [place]    History of non anemic vitamin B12 deficiency 2012    Leukopenia 2011    Osteoarthritis of right hip 2014    Overactive bladder 2013    Primary hypothyroidism 2011    S/P bilateral breast reduction     S/P cystoscopy     surveilance for Hx of resected Bladder cancer Dr Sussy Zhu S/P cystoscopy     susurveilance for Hx of resected Bladder cancer Dr Sussy Zhu S/P left colectomy     for colon cancer    S/P right colectomy     for villous adenoma with foci of adenocarcinoma    S/P total abdominal hysterectomy and bilateral salpingo-oophorectomy     Urinary bladder cancer ([facility] [address]     Resection of noninvasive

## 2018-02-08 NOTE — FLOWSHEET NOTE
Patient is awake, stating few words, vitals are stable. Repositioned to back for breakfast. No cough heard at this time. I.V. Restarted was leaking.

## 2018-02-08 NOTE — PROGRESS NOTES
Nutrition Assessment    Type and Reason for Visit: Initial, Positive Nutrition Screen    Nutrition Recommendations:Continue current diet, Start ONS (Add high calorie oral supplement tid)     Malnutrition Assessment:  · Malnutrition Status: Meets the criteria for moderate malnutrition  · Context: Acute illness or injury  · Findings of the 6 clinical characteristics of malnutrition (Minimum of 2 out of 6 clinical characteristics is required to make the diagnosis of moderate or severe Protein Calorie Malnutrition based on AND/ASPEN Guidelines):  1. Energy Intake-Not available,      2. Weight Loss-10% loss or greater, in 6 months  3. Fat Loss-Moderate subcutaneous fat loss, Orbital  4. Muscle Loss-Moderate muscle mass loss, Temples (temporalis muscle), Clavicles (pectoralis and deltoids)  5. Fluid Accumulation-Mild fluid accumulation,    6.  Strength-Not measured    Nutrition Diagnosis:   · Problem: Inadequate oral intake  · Etiology: related to  (Current condition/Chronic illness)     Signs and symptoms:  as evidenced by Weight loss greater than or equal to 10% in 6 months, Intake 0-25%, Presence of wounds    Nutrition Assessment:  · Subjective Assessment: Unable to interview pt. Nursing requested pureed diet/did not note any problems with liquids per nsg. Per nsgadm screen, no decrease in intake or weight noted. · Nutrition-Focused Physical Findings:    · Wound Type:  (Pe nsg notes: 2 areas on buttocks of red open skin with black centers. Right hip open area approximate 5x2 cm rectangular like shape.  Left hip area is open red with black area noted, aproximate area 7x2cm Both buttock have slow nick)  · Current Nutrition Therapies:  · Oral Diet Orders: Dysphagia 1 (Pureed)   · Oral Diet intake: 1-25%  · Oral Nutrition Supplement (ONS) Orders: None  · Anthropometric Measures:  · Ht: 5' 1\" (154.9 cm)   · Current Body Wt: (2/8) 113 lb 12 oz (51.6 kg)  · Admission Body Wt: 120lb (stated)  · Usual Body Wt: 132

## 2018-02-08 NOTE — PROGRESS NOTES
extends into the fissure and account for the appearance on the chest x-ray. There is also small area of atelectasis, infiltrate consolidation left lower lobe. There is a small pleural-based nodule at the superior medial aspect of the right upper lobe, image 9 series 3. It measures approximately 6 to 7 mm. No pneumothoraces. There is aneurysmal dilatation of the ascending arch the aorta measuring approximately 4.2 cm. No significant periaortic, pretracheal, parahilar adenopathy. The field-of-view the kidneys are partially visualized. The 2. Atrophic. Correlate with underlying medical renal function. There is less than a 2 mm nonobstructing calculi of the right kidney. Visualized osseous structures show multilevel degenerative change with bridging osteophytes. 1. THERE ARE BIBASILAR INFILTRATES CONSOLIDATIONS RIGHT GREATER THAN LEFT. THERE IS ASSOCIATED MODERATE RIGHT-SIDED PLEURAL EFFUSION WITH THE PLEURAL EFFUSION TRACKING TO THE FISSURE CAUSING APPEARANCE WITHIN THE CHEST X-RAY ON THE RIGHT. 2. THERE IS ANEURYSMAL DILATATION OF THE ASCENDING ARCH THE AORTA. THE KIDNEYS ARE PARTIALLY VISUALIZED. THEY DO APPEAR ATROPHIC. CORRELATE WITH UNDERLYING MEDICAL RENAL FUNCTION. THERE IS ALSO A LESS THAN 2 MM NONOBSTRUCTING RIGHT RENAL CALCULI. 4. THERE IS A 7 MM PLEURAL-BASED NODULE AT THE SUPERIOR LATERAL ASPECT OF THE RIGHT UPPER LOBE. WILL NEED FOLLOW-UP. All CT scans at this facility use dose modulation, iterative reconstruction, and/or weight based dosing when appropriate to reduce radiation dose to as low as reasonably achievable. Xr Chest Portable    Result Date: 2/6/2018  EXAMINATION: Portable AP ERECT view of the chest. CLINICAL HISTORY: Cough. COMPARISONS: 8/3/2017 FINDINGS: Normal cardiac silhouette. There are atherosclerotic calcifications in the aortic arch. The right costophrenic angle is blunted secondary to small pleural effusion. There is infiltrate/atelectasis at the right lung base.  Left lung is clear.  There are moderate degenerative changes in spine. Mild dextroscoliosis dorsal spine. SMALL RIGHT-SIDED PLEURAL EFFUSION. RIGHT BASILAR INFILTRATE/ATELECTASIS. ASSESSMENT Plan:  1. Influenza A  2. Bibasilar pneumonia and atelectasis with right-sided pleural effusion  3. Advanced dementia  4. Chronic kidney disease  5. Chronic leukopenia    Patient is currently receiving Levaquin 250 mg every 24 hours and Tamiflu 30 mg twice a day. She is receiving bronchodilator therapy with DuoNeb 3 times a day and albuterol every 2 hours when needed. Patient has been encouraged to use incentive spirometer and acapella. Per consult performed yesterday by Dr. Lynnette Pearson patient is a high risk for arthrocentesis due to her bedbound condition and thus we will just observe at this time. Continue current treatment plan we'll continue to follow.     Electronically signed by Carlos Gonsalez PA-C on 2/8/2018 at 3:13 PM

## 2018-02-08 NOTE — PROGRESS NOTES
ecchymosis of the bilateral upper extremities, the stage I decubitus. Eyes:Eye: Normal external eye, conjunctiva, RIZWAN. ENT: Head: Normocephalic, no lesions, without obvious abnormality. Neck: no adenopathy, no carotid bruit, no JVD, supple, symmetrical, trachea midline and thyroid not enlarged, symmetric, no tenderness/mass/nodules  Respiratory: Decreased breath sounds bilaterally with bibasilar rales. Cardiovascular: regular rate and rhythm, S1, S2 normal, no murmur, click, rub or gallop  Gastrointestinal: soft, non-tender; bowel sounds normal; no masses,  no organomegaly  Genitourinary: Deferred  Musculoskeletal: The muscles bilaterally with appropriate muscle strength. Neurologic: Mental status AAOx self No facial asymmetry or droop. Normal muscle strength b/l. Psychiatric: Unable to assess due to dementia.   Hematologic: No obvious bruising or bleeding  Data    LABS  Recent Labs      02/06/18   1637  02/07/18   0601  02/08/18   0640   WBC  3.1*  3.3*  5.6   RBC  3.88*  3.36*  3.26*   HGB  11.2*  9.9*  9.7*   HCT  34.8*  30.0*  29.3*   MCV  89.5  89.1  89.8   MCH  28.9  29.5  29.6   MCHC  32.3*  33.1  33.0   RDW  16.4*  16.4*  16.6*   PLT  154  132  121*       Recent Labs      02/06/18   1637  02/07/18   0601  02/08/18   0640   NA  140  139  145*   K  4.2  3.7  3.4*   CL  104  103  109*   CO2  23  25  24   BUN  23  21  20   CREATININE  1.01*  1.07*  1.06*   GLUCOSE  93  77  94   CALCIUM  8.6  8.3*  8.1*       Recent Labs      02/07/18   0601   MG  2.2         ASSESSMENT AND PLAN      Active Hospital Problems    Diagnosis Date Noted    Moderate malnutrition ([facility] [address] [E44.0] 02/08/2018    Community acquired pneumonia [J18.9] 02/07/2018    Influenza [J11.1] 02/06/2018    Essential hypertension, benign [I10] 09/09/2011    Primary hypothyroidism [E03.9] 09/09/2011     - Influenza: Continue Tamiflu  - CAP: On levaquin 750 mg every 48 hours   - HTN: controlled on current medication regimen  - Hypothyroidism:

## 2018-02-09 LAB — VANCOMYCIN TROUGH: 4.5 UG/ML (ref 5–10)

## 2018-02-09 PROCEDURE — 94640 AIRWAY INHALATION TREATMENT: CPT

## 2018-02-09 PROCEDURE — 6370000000 HC RX 637 (ALT 250 FOR IP): Performed by: INTERNAL MEDICINE

## 2018-02-09 PROCEDURE — 6360000002 HC RX W HCPCS: Performed by: INTERNAL MEDICINE

## 2018-02-09 PROCEDURE — 94664 DEMO&/EVAL PT USE INHALER: CPT

## 2018-02-09 PROCEDURE — APPSS15 APP SPLIT SHARED TIME 0-15 MINUTES: Performed by: PHYSICIAN ASSISTANT

## 2018-02-09 PROCEDURE — 36415 COLL VENOUS BLD VENIPUNCTURE: CPT

## 2018-02-09 PROCEDURE — 2580000003 HC RX 258: Performed by: INTERNAL MEDICINE

## 2018-02-09 PROCEDURE — 80202 ASSAY OF VANCOMYCIN: CPT

## 2018-02-09 PROCEDURE — 1210000000 HC MED SURG R&B

## 2018-02-09 PROCEDURE — 99232 SBSQ HOSP IP/OBS MODERATE 35: CPT | Performed by: INTERNAL MEDICINE

## 2018-02-09 RX ORDER — IPRATROPIUM BROMIDE AND ALBUTEROL SULFATE 2.5; .5 MG/3ML; MG/3ML
1 SOLUTION RESPIRATORY (INHALATION) 3 TIMES DAILY
Status: DISCONTINUED | OUTPATIENT
Start: 2018-02-09 | End: 2018-02-14 | Stop reason: HOSPADM

## 2018-02-09 RX ADMIN — MELATONIN TAB 3 MG 5 MG: 3 TAB at 21:25

## 2018-02-09 RX ADMIN — OSELTAMIVIR PHOSPHATE 30 MG: 30 CAPSULE ORAL at 21:33

## 2018-02-09 RX ADMIN — IPRATROPIUM BROMIDE AND ALBUTEROL SULFATE 1 AMPULE: .5; 3 SOLUTION RESPIRATORY (INHALATION) at 14:22

## 2018-02-09 RX ADMIN — LEVOFLOXACIN 750 MG: 500 TABLET, FILM COATED ORAL at 12:02

## 2018-02-09 RX ADMIN — TRAZODONE HYDROCHLORIDE 50 MG: 50 TABLET ORAL at 21:39

## 2018-02-09 RX ADMIN — HYDROCHLOROTHIAZIDE 12.5 MG: 12.5 CAPSULE ORAL at 08:56

## 2018-02-09 RX ADMIN — LEVOTHYROXINE SODIUM 150 MCG: 150 TABLET ORAL at 06:40

## 2018-02-09 RX ADMIN — SODIUM CHLORIDE: 9 INJECTION, SOLUTION INTRAVENOUS at 21:25

## 2018-02-09 RX ADMIN — IPRATROPIUM BROMIDE AND ALBUTEROL SULFATE 1 AMPULE: .5; 3 SOLUTION RESPIRATORY (INHALATION) at 07:42

## 2018-02-09 RX ADMIN — OSELTAMIVIR PHOSPHATE 30 MG: 30 CAPSULE ORAL at 08:56

## 2018-02-09 RX ADMIN — IPRATROPIUM BROMIDE AND ALBUTEROL SULFATE 1 AMPULE: .5; 3 SOLUTION RESPIRATORY (INHALATION) at 20:18

## 2018-02-09 RX ADMIN — HYDRALAZINE HYDROCHLORIDE 50 MG: 50 TABLET, FILM COATED ORAL at 21:26

## 2018-02-09 RX ADMIN — ENOXAPARIN SODIUM 30 MG: 30 INJECTION SUBCUTANEOUS at 08:57

## 2018-02-09 RX ADMIN — ACETAMINOPHEN 650 MG: 325 TABLET, FILM COATED ORAL at 21:33

## 2018-02-09 RX ADMIN — DILTIAZEM HYDROCHLORIDE 240 MG: 240 CAPSULE, COATED, EXTENDED RELEASE ORAL at 08:56

## 2018-02-09 RX ADMIN — ASPIRIN 81 MG 81 MG: 81 TABLET ORAL at 08:56

## 2018-02-09 RX ADMIN — HYDRALAZINE HYDROCHLORIDE 50 MG: 50 TABLET, FILM COATED ORAL at 08:57

## 2018-02-09 RX ADMIN — RISPERIDONE 0.25 MG: 0.25 TABLET ORAL at 08:56

## 2018-02-09 RX ADMIN — SODIUM CHLORIDE: 9 INJECTION, SOLUTION INTRAVENOUS at 11:56

## 2018-02-09 RX ADMIN — PAROXETINE HYDROCHLORIDE 15 MG: 30 TABLET, FILM COATED ORAL at 08:56

## 2018-02-09 RX ADMIN — VITAMIN D, TAB 1000IU (100/BT) 2000 UNITS: 25 TAB at 08:56

## 2018-02-09 ASSESSMENT — PAIN SCALES - GENERAL: PAINLEVEL_OUTOF10: 4

## 2018-02-09 NOTE — PROGRESS NOTES
dextroscoliosis dorsal spine. SMALL RIGHT-SIDED PLEURAL EFFUSION. RIGHT BASILAR INFILTRATE/ATELECTASIS. ASSESSMENT /Plan:  1. Influenza A  2. Bibasilar pneumonia and atelectasis with right-sided pleural effusion. 3. Advanced dementia  4. Chronic kidney disease  5. Chronic leukopenia       Patient is currently receiving Levaquin 250 mg every 24 hours and Tamiflu 30 mg twice a day. She is receiving bronchodilator therapy with DuoNeb 3 times daily and albuterol every 2 hours when needed. Encouraged patient to use incentive spirometer and acapella. Patient has effusion however thoracentesis would be high risk due to her bedbound condition we will just observe at this time. Also discussed with nursing staff some concern about some erythema and skin breakdown around the buttocks area due to incontinence. It appears wound consult has already been performed and they are following. Continue current treatment plan we'll continue to follow.     Electronically signed by George Araiza PA-C on 2/9/2018 at 12:38 PM

## 2018-02-09 NOTE — CARE COORDINATION
LSW spoke with pt's daughter, Susan Onofre who is her primary caregiver. LSW inquired about the DC plan for pt and Susan Onofre said that she and her sister want the pt to come home but they have not discussed this with her brothers and one of them is out of town until Monday. Susan Onofre said that she has help to care for her mom 5 days per week and she feels that she can manage her care at home. LSW offered to discuss with other siblings but Susan Onofre refused to share their numbers with LSW. Pt will require a precert if she is going to go to a SNF. At this time plan is for home at [facility] per daughter, Susan Onofre.

## 2018-02-10 PROCEDURE — 2580000003 HC RX 258: Performed by: INTERNAL MEDICINE

## 2018-02-10 PROCEDURE — 94640 AIRWAY INHALATION TREATMENT: CPT

## 2018-02-10 PROCEDURE — 6370000000 HC RX 637 (ALT 250 FOR IP): Performed by: INTERNAL MEDICINE

## 2018-02-10 PROCEDURE — 1210000000 HC MED SURG R&B

## 2018-02-10 PROCEDURE — 99232 SBSQ HOSP IP/OBS MODERATE 35: CPT | Performed by: INTERNAL MEDICINE

## 2018-02-10 PROCEDURE — 6360000002 HC RX W HCPCS: Performed by: INTERNAL MEDICINE

## 2018-02-10 RX ADMIN — VITAMIN D, TAB 1000IU (100/BT) 2000 UNITS: 25 TAB at 09:55

## 2018-02-10 RX ADMIN — HYDRALAZINE HYDROCHLORIDE 50 MG: 50 TABLET, FILM COATED ORAL at 09:55

## 2018-02-10 RX ADMIN — PAROXETINE HYDROCHLORIDE 15 MG: 30 TABLET, FILM COATED ORAL at 09:55

## 2018-02-10 RX ADMIN — IPRATROPIUM BROMIDE AND ALBUTEROL SULFATE 1 AMPULE: .5; 3 SOLUTION RESPIRATORY (INHALATION) at 21:42

## 2018-02-10 RX ADMIN — HYDROCHLOROTHIAZIDE 12.5 MG: 12.5 CAPSULE ORAL at 09:55

## 2018-02-10 RX ADMIN — SODIUM CHLORIDE: 9 INJECTION, SOLUTION INTRAVENOUS at 09:55

## 2018-02-10 RX ADMIN — OSELTAMIVIR PHOSPHATE 30 MG: 30 CAPSULE ORAL at 20:55

## 2018-02-10 RX ADMIN — HYDRALAZINE HYDROCHLORIDE 50 MG: 50 TABLET, FILM COATED ORAL at 20:56

## 2018-02-10 RX ADMIN — ENOXAPARIN SODIUM 30 MG: 30 INJECTION SUBCUTANEOUS at 09:55

## 2018-02-10 RX ADMIN — RISPERIDONE 0.25 MG: 0.25 TABLET ORAL at 09:55

## 2018-02-10 RX ADMIN — MELATONIN TAB 3 MG 5 MG: 3 TAB at 20:55

## 2018-02-10 RX ADMIN — SODIUM CHLORIDE: 9 INJECTION, SOLUTION INTRAVENOUS at 20:55

## 2018-02-10 RX ADMIN — IPRATROPIUM BROMIDE AND ALBUTEROL SULFATE 1 AMPULE: .5; 3 SOLUTION RESPIRATORY (INHALATION) at 09:02

## 2018-02-10 RX ADMIN — Medication 10 ML: at 21:01

## 2018-02-10 RX ADMIN — Medication 10 ML: at 09:56

## 2018-02-10 RX ADMIN — OSELTAMIVIR PHOSPHATE 30 MG: 30 CAPSULE ORAL at 09:56

## 2018-02-10 RX ADMIN — IPRATROPIUM BROMIDE AND ALBUTEROL SULFATE 1 AMPULE: .5; 3 SOLUTION RESPIRATORY (INHALATION) at 13:20

## 2018-02-10 RX ADMIN — ASPIRIN 81 MG 81 MG: 81 TABLET ORAL at 09:55

## 2018-02-10 RX ADMIN — DILTIAZEM HYDROCHLORIDE 240 MG: 240 CAPSULE, COATED, EXTENDED RELEASE ORAL at 09:55

## 2018-02-10 ASSESSMENT — ENCOUNTER SYMPTOMS: SHORTNESS OF BREATH: 0

## 2018-02-10 NOTE — PLAN OF CARE
Problem: Risk for Impaired Skin Integrity  Goal: Tissue integrity - skin and mucous membranes  Structural intactness and normal physiological function of skin and  mucous membranes.    Outcome: Ongoing      Problem: Falls - Risk of  Goal: Absence of falls  Outcome: Ongoing      Problem: Nutrition  Goal: Optimal nutrition therapy  Outcome: Ongoing

## 2018-02-11 ENCOUNTER — APPOINTMENT (OUTPATIENT)
Dept: GENERAL RADIOLOGY | Age: 83
DRG: 193 | End: 2018-02-11
Payer: MEDICARE

## 2018-02-11 LAB
ANION GAP SERPL CALCULATED.3IONS-SCNC: 12 MEQ/L (ref 7–13)
BASE EXCESS ARTERIAL: -1 (ref -3–3)
BASOPHILS ABSOLUTE: 0 K/UL (ref 0–0.2)
BASOPHILS RELATIVE PERCENT: 0.2 %
BLOOD CULTURE, ROUTINE: NORMAL
BUN BLDV-MCNC: 12 MG/DL (ref 8–23)
CALCIUM SERPL-MCNC: 7.8 MG/DL (ref 8.6–10.2)
CHLORIDE BLD-SCNC: 110 MEQ/L (ref 98–107)
CO2: 24 MEQ/L (ref 22–29)
CREAT SERPL-MCNC: 0.86 MG/DL (ref 0.5–0.9)
CULTURE, BLOOD 2: NORMAL
EOSINOPHILS ABSOLUTE: 0 K/UL (ref 0–0.7)
EOSINOPHILS RELATIVE PERCENT: 0.3 %
GFR AFRICAN AMERICAN: >60
GFR NON-AFRICAN AMERICAN: >60
GLUCOSE BLD-MCNC: 90 MG/DL (ref 74–109)
HCO3 ARTERIAL: 23.1 MMOL/L (ref 21–29)
HCT VFR BLD CALC: 28.1 % (ref 37–47)
HEMOGLOBIN: 9.3 G/DL (ref 12–16)
LACTATE: 0.58 MMOL/L (ref 0.4–2)
LYMPHOCYTES ABSOLUTE: 0.7 K/UL (ref 1–4.8)
LYMPHOCYTES RELATIVE PERCENT: 8.7 %
MCH RBC QN AUTO: 29.4 PG (ref 27–31.3)
MCHC RBC AUTO-ENTMCNC: 33 % (ref 33–37)
MCV RBC AUTO: 89.1 FL (ref 82–100)
MONOCYTES ABSOLUTE: 0.5 K/UL (ref 0.2–0.8)
MONOCYTES RELATIVE PERCENT: 6.6 %
NEUTROPHILS ABSOLUTE: 6.5 K/UL (ref 1.4–6.5)
NEUTROPHILS RELATIVE PERCENT: 84.2 %
O2 SAT, ARTERIAL: 98 % (ref 93–100)
PCO2 ARTERIAL: 31 MM HG (ref 35–45)
PDW BLD-RTO: 16.5 % (ref 11.5–14.5)
PERFORMED ON: ABNORMAL
PH ARTERIAL: 7.47 (ref 7.35–7.45)
PLATELET # BLD: 136 K/UL (ref 130–400)
PO2 ARTERIAL: 92 MM HG (ref 75–108)
POC FIO2: 21
POC SAMPLE TYPE: ABNORMAL
POTASSIUM SERPL-SCNC: 2.6 MEQ/L (ref 3.5–5.1)
RBC # BLD: 3.15 M/UL (ref 4.2–5.4)
SODIUM BLD-SCNC: 146 MEQ/L (ref 132–144)
TCO2 ARTERIAL: 24 (ref 22–29)
WBC # BLD: 7.8 K/UL (ref 4.8–10.8)

## 2018-02-11 PROCEDURE — 6370000000 HC RX 637 (ALT 250 FOR IP): Performed by: INTERNAL MEDICINE

## 2018-02-11 PROCEDURE — 83605 ASSAY OF LACTIC ACID: CPT

## 2018-02-11 PROCEDURE — 36415 COLL VENOUS BLD VENIPUNCTURE: CPT

## 2018-02-11 PROCEDURE — 94640 AIRWAY INHALATION TREATMENT: CPT

## 2018-02-11 PROCEDURE — 82803 BLOOD GASES ANY COMBINATION: CPT

## 2018-02-11 PROCEDURE — 80048 BASIC METABOLIC PNL TOTAL CA: CPT

## 2018-02-11 PROCEDURE — 71045 X-RAY EXAM CHEST 1 VIEW: CPT

## 2018-02-11 PROCEDURE — 36600 WITHDRAWAL OF ARTERIAL BLOOD: CPT

## 2018-02-11 PROCEDURE — 85025 COMPLETE CBC W/AUTO DIFF WBC: CPT

## 2018-02-11 PROCEDURE — 1210000000 HC MED SURG R&B

## 2018-02-11 PROCEDURE — 99233 SBSQ HOSP IP/OBS HIGH 50: CPT | Performed by: INTERNAL MEDICINE

## 2018-02-11 RX ORDER — POTASSIUM CHLORIDE 20MEQ/15ML
40 LIQUID (ML) ORAL ONCE
Status: COMPLETED | OUTPATIENT
Start: 2018-02-11 | End: 2018-02-12

## 2018-02-11 RX ORDER — POTASSIUM CHLORIDE 20 MEQ/1
10 TABLET, EXTENDED RELEASE ORAL
Status: DISCONTINUED | OUTPATIENT
Start: 2018-02-12 | End: 2018-02-12

## 2018-02-11 RX ADMIN — IPRATROPIUM BROMIDE AND ALBUTEROL SULFATE 1 AMPULE: .5; 3 SOLUTION RESPIRATORY (INHALATION) at 14:32

## 2018-02-11 RX ADMIN — LEVOFLOXACIN 750 MG: 500 TABLET, FILM COATED ORAL at 13:30

## 2018-02-11 RX ADMIN — IPRATROPIUM BROMIDE AND ALBUTEROL SULFATE 1 AMPULE: .5; 3 SOLUTION RESPIRATORY (INHALATION) at 07:46

## 2018-02-11 RX ADMIN — IPRATROPIUM BROMIDE AND ALBUTEROL SULFATE 1 AMPULE: .5; 3 SOLUTION RESPIRATORY (INHALATION) at 19:33

## 2018-02-11 ASSESSMENT — ENCOUNTER SYMPTOMS: SHORTNESS OF BREATH: 0

## 2018-02-11 NOTE — PROGRESS NOTES
Patient alert and oriented x 2 with forgetfulness noted, is postive influenza, droplet precautions in place, patient was given bed bath and repositioned q 2 hours thus far and will continue throughout shift. Patient compliant with taking 2100 medications, likes whole in applesauce, is dependent for assist with adl's. Patient in bed with call light in reach, will continue to monitor. Bed alarm is turned on.

## 2018-02-11 NOTE — PLAN OF CARE
Problem: Risk for Impaired Skin Integrity  Goal: Tissue integrity - skin and mucous membranes  Structural intactness and normal physiological function of skin and  mucous membranes. Outcome: Ongoing      Problem: Falls - Risk of  Goal: Absence of falls  Outcome: Ongoing      Problem: Nutrition  Goal: Optimal nutrition therapy  Outcome: Ongoing      Problem:  Activity:  Goal: Energy level will increase  Energy level will increase   Outcome: Ongoing    Goal: Ability to return to normal activity level will improve  Ability to return to normal activity level will improve   Outcome: Ongoing      Problem: Fluid Volume:  Goal: Maintenance of adequate hydration will improve  Maintenance of adequate hydration will improve   Outcome: Ongoing      Problem: Health Behavior:  Goal: Compliance with immunization standards will improve  Compliance with immunization standards will improve   Outcome: Ongoing      Problem: Physical Regulation:  Goal: Complications related to the disease process, condition or treatment will be avoided or minimized  Complications related to the disease process, condition or treatment will be avoided or minimized   Outcome: Ongoing    Goal: Ability to maintain clinical measurements within normal limits will improve  Ability to maintain clinical measurements within normal limits will improve   Outcome: Ongoing    Goal: Signs and symptoms of infection will decrease  Signs and symptoms of infection will decrease   Outcome: Ongoing      Problem: Respiratory:  Goal: Respiratory status will improve  Respiratory status will improve   Outcome: Ongoing      Problem: Sensory:  Goal: General experience of comfort will improve  General experience of comfort will improve   Outcome: Ongoing

## 2018-02-11 NOTE — PROGRESS NOTES
INPATIENT PROGRESS NOTES    PATIENT NAME: Ayaan Chris  MRN: [id number]  SERVICE DATE:  February 11, 2018   SERVICE TIME:  12:13 PM      PRIMARY SERVICE:  Pulmonary Medicine   Chief complaint: influenza A   INTERVAL HPI: Patient seen and examined at bedside, Interval Notes, orders reviewed. Nursing notes noted:   Now asleep daughter at bedside, mom was awake and she tend to sleep this way if she does not sleep at night, patient is improving, SOB and CP better     Social History   Substance Use Topics    Smoking status: Former Smoker     Packs/day: 0.30     Types: Cigarettes     Quit date: 1/1/1981    Smokeless tobacco: Never Used    Alcohol use No   History reviewed. No pertinent family history. OBJECTIVE    Body mass index is 21.49 kg/m². PHYSICAL EXAM:  Vitals:  /60   Pulse 72   Temp 98.4 °F (36.9 °C) (Oral)   Resp 16   Ht 5' 1\" (1.549 m)   Wt 113 lb 12.1 oz (51.6 kg)   SpO2 94%   BMI 21.49 kg/m²   General: Patient is sleeping comfortable in bed, No distress. Head: Atraumatic , Normocephalic   Eyes:   No sclera icterus. No conjunctival injection. No discharge   ENT: No nasal  discharge. Pharynx clear. Neck:  Trachea midline. No thyromegaly, no JVD, No cervical adenopathy. Resp : Diminished breath sounds bilaterally a few scattered rhonchi at the base. No wheezing noted normal effort no accessory muscle use. CV: Normal  rate. Regular rhythm. No mumur ,  Rub or gallop  ABD: Non-tender. Non-distended. No masses. No organmegaly. Normal bowel sounds. No hernia. EXT: No Pitting, No Cyanosis No clubbing  Skin: Warm and dry.   Skin breakdown and erythema on buttocks area, this was present on admission wound care is following        DATA:   Recent Labs      02/11/18   0645   WBC  7.8   HGB  9.3*   HCT  28.1*   MCV  89.1   PLT  136     Recent Labs      02/11/18   0645   NA  146*   K  2.6*   CL  110*   CO2  24   BUN  12   CREATININE  0.86   GLUCOSE  90   CALCIUM  7.8*   LABGLOM  >60.0   GFRAA

## 2018-02-12 LAB
ANION GAP SERPL CALCULATED.3IONS-SCNC: 11 MEQ/L (ref 7–13)
BUN BLDV-MCNC: 14 MG/DL (ref 8–23)
CALCIUM SERPL-MCNC: 7.7 MG/DL (ref 8.6–10.2)
CHLORIDE BLD-SCNC: 110 MEQ/L (ref 98–107)
CO2: 26 MEQ/L (ref 22–29)
CREAT SERPL-MCNC: 0.9 MG/DL (ref 0.5–0.9)
GFR AFRICAN AMERICAN: >60
GFR NON-AFRICAN AMERICAN: 57.9
GLUCOSE BLD-MCNC: 89 MG/DL (ref 74–109)
POTASSIUM SERPL-SCNC: 2.7 MEQ/L (ref 3.5–5.1)
SODIUM BLD-SCNC: 147 MEQ/L (ref 132–144)

## 2018-02-12 PROCEDURE — 99232 SBSQ HOSP IP/OBS MODERATE 35: CPT | Performed by: INTERNAL MEDICINE

## 2018-02-12 PROCEDURE — 6370000000 HC RX 637 (ALT 250 FOR IP): Performed by: INTERNAL MEDICINE

## 2018-02-12 PROCEDURE — 36415 COLL VENOUS BLD VENIPUNCTURE: CPT

## 2018-02-12 PROCEDURE — APPSS15 APP SPLIT SHARED TIME 0-15 MINUTES: Performed by: PHYSICIAN ASSISTANT

## 2018-02-12 PROCEDURE — 94760 N-INVAS EAR/PLS OXIMETRY 1: CPT

## 2018-02-12 PROCEDURE — 94664 DEMO&/EVAL PT USE INHALER: CPT

## 2018-02-12 PROCEDURE — 94640 AIRWAY INHALATION TREATMENT: CPT

## 2018-02-12 PROCEDURE — 1210000000 HC MED SURG R&B

## 2018-02-12 PROCEDURE — 80048 BASIC METABOLIC PNL TOTAL CA: CPT

## 2018-02-12 PROCEDURE — 6360000002 HC RX W HCPCS: Performed by: INTERNAL MEDICINE

## 2018-02-12 PROCEDURE — 2580000003 HC RX 258: Performed by: INTERNAL MEDICINE

## 2018-02-12 RX ORDER — POTASSIUM CHLORIDE 20 MEQ/1
40 TABLET, EXTENDED RELEASE ORAL 2 TIMES DAILY WITH MEALS
Status: COMPLETED | OUTPATIENT
Start: 2018-02-12 | End: 2018-02-13

## 2018-02-12 RX ADMIN — ENOXAPARIN SODIUM 30 MG: 30 INJECTION SUBCUTANEOUS at 08:43

## 2018-02-12 RX ADMIN — LEVOTHYROXINE SODIUM 150 MCG: 150 TABLET ORAL at 06:03

## 2018-02-12 RX ADMIN — IPRATROPIUM BROMIDE AND ALBUTEROL SULFATE 1 AMPULE: .5; 3 SOLUTION RESPIRATORY (INHALATION) at 07:17

## 2018-02-12 RX ADMIN — MELATONIN TAB 3 MG 5 MG: 3 TAB at 20:30

## 2018-02-12 RX ADMIN — PAROXETINE HYDROCHLORIDE 15 MG: 30 TABLET, FILM COATED ORAL at 08:52

## 2018-02-12 RX ADMIN — POTASSIUM CHLORIDE 40 MEQ: 20 TABLET, EXTENDED RELEASE ORAL at 20:30

## 2018-02-12 RX ADMIN — HYDRALAZINE HYDROCHLORIDE 50 MG: 50 TABLET, FILM COATED ORAL at 08:44

## 2018-02-12 RX ADMIN — DILTIAZEM HYDROCHLORIDE 240 MG: 240 CAPSULE, COATED, EXTENDED RELEASE ORAL at 08:43

## 2018-02-12 RX ADMIN — IPRATROPIUM BROMIDE AND ALBUTEROL SULFATE 1 AMPULE: .5; 3 SOLUTION RESPIRATORY (INHALATION) at 20:03

## 2018-02-12 RX ADMIN — HYDROCHLOROTHIAZIDE 12.5 MG: 12.5 CAPSULE ORAL at 08:44

## 2018-02-12 RX ADMIN — POTASSIUM CHLORIDE 40 MEQ: 20 SOLUTION ORAL at 09:01

## 2018-02-12 RX ADMIN — ASPIRIN 81 MG 81 MG: 81 TABLET ORAL at 08:45

## 2018-02-12 RX ADMIN — Medication 10 ML: at 20:32

## 2018-02-12 RX ADMIN — IPRATROPIUM BROMIDE AND ALBUTEROL SULFATE 1 AMPULE: .5; 3 SOLUTION RESPIRATORY (INHALATION) at 13:55

## 2018-02-12 RX ADMIN — RISPERIDONE 0.25 MG: 0.25 TABLET ORAL at 08:44

## 2018-02-12 RX ADMIN — VITAMIN D, TAB 1000IU (100/BT) 2000 UNITS: 25 TAB at 08:44

## 2018-02-12 RX ADMIN — HYDRALAZINE HYDROCHLORIDE 50 MG: 50 TABLET, FILM COATED ORAL at 20:30

## 2018-02-12 RX ADMIN — Medication 10 ML: at 08:49

## 2018-02-12 NOTE — PROGRESS NOTES
Diminished breath sounds bilaterally a few scattered rhonchi at the bases. No wheezing noted normal effort no excessive muscle use. CV: Normal  rate. Regular rhythm. No mumur ,  Rub or gallop  ABD: Non-tender. Non-distended. No masses. No organmegaly. Normal bowel sounds. No hernia. EXT: No Pitting, No Cyanosis No clubbing  Neuro: no focal weakness  Skin: Warm and dry. Skin breakdown and erythema on but ask area, this was present on admission wound care is now following  Data      LABS  Recent Labs      02/11/18   0645   WBC  7.8   RBC  3.15*   HGB  9.3*   HCT  28.1*   MCV  89.1   MCH  29.4   MCHC  33.0   RDW  16.5*   PLT  136       Recent Labs      02/11/18   0645  02/12/18   0631   NA  146*  147*   K  2.6*  2.7*   CL  110*  110*   CO2  24  26   BUN  12  14   CREATININE  0.86  0.90   GLUCOSE  90  89   CALCIUM  7.8*  7.7*       No results for input(s): MG in the last 72 hours. ASSESSMENT AND PLAN      - Influenza: tamiflu  - Pneumonia: on Levaquin  - Hypokalemia: replaced with oral potassium  - Duoneb and albuterol  - IS and acapella    Patient still full code .  Daughter has not gotten back after discussing with siblings regarding code status    Adina Gee MD  Pager : [phone]

## 2018-02-12 NOTE — PROGRESS NOTES
INPATIENT PROGRESS NOTES    PATIENT NAME: Chuck Marrero  MRN: [id number]  SERVICE DATE:  February 12, 2018   SERVICE TIME:  12:03 PM      PRIMARY SERVICE:  Pulmonary Medicine     INTERVAL HPI: Patient seen and examined at bedside, Interval Notes, orders reviewed. Nursing notes noted: Patient is awake this morning and responsive to questions. She denies any shortness of breath and chest pain. She has remained afebrile without chills. Patient has been on room air and has been at 97% SPO2. Patient appears more awake than prior days. Review of Systems  Please see HPI above. OBJECTIVE    Body mass index is 21.49 kg/m². PHYSICAL EXAM:  Vitals:  /66   Pulse 96   Temp 97.5 °F (36.4 °C) (Oral)   Resp 18   Ht 5' 1\" (1.549 m)   Wt 113 lb 12.1 oz (51.6 kg)   SpO2 97%   BMI 21.49 kg/m²   General: Patient is comfortable in bed, No distress. Head: Atraumatic , Normocephalic   Eyes: PERRL. No sclera icterus. No conjunctival injection. No discharge   ENT: No nasal  discharge. Pharynx clear. Neck:  Trachea midline. No thyromegaly, no JVD, No cervical adenopathy. Resp : Diminished breath sounds bilaterally a few scattered rhonchi at the bases. No wheezing noted normal effort no excessive muscle use. CV: Normal  rate. Regular rhythm. No mumur ,  Rub or gallop  ABD: Non-tender. Non-distended. No masses. No organmegaly. Normal bowel sounds. No hernia. EXT: No Pitting, No Cyanosis No clubbing  Neuro: no focal weakness  Skin: Warm and dry.   Skin breakdown and erythema on but ask area, this was present on admission wound care is now following    DATA:   Recent Labs      02/11/18   0645   WBC  7.8   HGB  9.3*   HCT  28.1*   MCV  89.1   PLT  136     Recent Labs      02/11/18   0645  02/12/18   0631   NA  146*  147*   K  2.6*  2.7*   CL  110*  110*   CO2  24  26   BUN  12  14   CREATININE  0.86  0.90   GLUCOSE  90  89   CALCIUM  7.8*  7.7*   LABGLOM  >60.0  57.9*   GFRAA  >60.0  >60.0         Recent Labs

## 2018-02-12 NOTE — FLOWSHEET NOTE
Patient in bed resting with family at bedside, pt ate 75% of breakfast and lunch this shift. Turned q2hr, mepilex on sacrum and bilateral knees. Family updated by nurse.

## 2018-02-12 NOTE — PROGRESS NOTES
Nutrition Assessment    Type and Reason for Visit: Initial, Positive Nutrition Screen    Nutrition Recommendations: Continue current diet, Modify current ONS (continue high calorie oral supplement/ add frozen oral supplement bid and nutritious pudding x1 per day)    Malnutrition Assessment:  · Malnutrition Status: Meets the criteria for moderate malnutrition  · Context: Acute illness or injury  · Findings of the 6 clinical characteristics of malnutrition (Minimum of 2 out of 6 clinical characteristics is required to make the diagnosis of moderate or severe Protein Calorie Malnutrition based on AND/ASPEN Guidelines):  1. Energy Intake-Not available,      2. Weight Loss-10% loss or greater, in 6 months  3. Fat Loss-Moderate subcutaneous fat loss, Orbital  4. Muscle Loss-Moderate muscle mass loss, Temples (temporalis muscle), Clavicles (pectoralis and deltoids)  5. Fluid Accumulation-Mild fluid accumulation,    6.  Strength-Not measured    Nutrition Diagnosis:   · Problem: Inadequate oral intake, Increased nutrient needs  · Etiology: related to Insufficient energy/nutrient consumption, Increased demand for energy/nutrients due to     Signs and symptoms:  as evidenced by Weight loss greater than or equal to 10% in 6 months, Intake 25-50%, Presence of wounds    Nutrition Assessment:  · Subjective Assessment: Pt is a FEED per nsg, ate ~50% of breakfast.  · Wound Type:  (Pe nsg notes: 2 areas on buttocks of red open skin with black centers. Right hip open area approximate 5x2 cm rectangular like shape.  Left hip area is open red with black area noted, aproximate area 7x2cm Both buttock have slow nick)  · Current Nutrition Therapies:  · Oral Diet Orders: Dysphagia 1 (Pureed)   · Oral Diet intake: 26-50%  · Oral Nutrition Supplement (ONS) Orders: Standard High Calorie Oral Supplement (tid)  · ONS intake: Unable to assess  · Anthropometric Measures:  · Ht: 5' 1\" (154.9 cm)   · Admission Body Wt: 113 lb 12 oz (51.6

## 2018-02-13 ENCOUNTER — APPOINTMENT (OUTPATIENT)
Dept: GENERAL RADIOLOGY | Age: 83
DRG: 193 | End: 2018-02-13
Payer: MEDICARE

## 2018-02-13 LAB
ANION GAP SERPL CALCULATED.3IONS-SCNC: 10 MEQ/L (ref 7–13)
ANISOCYTOSIS: 0
ATYPICAL LYMPHOCYTE RELATIVE PERCENT: 1 %
BANDED NEUTROPHILS RELATIVE PERCENT: 2 % (ref 5–11)
BASOPHILS ABSOLUTE: 0 K/UL (ref 0–0.2)
BASOPHILS RELATIVE PERCENT: 1 %
BUN BLDV-MCNC: 18 MG/DL (ref 8–23)
CALCIUM SERPL-MCNC: 8 MG/DL (ref 8.6–10.2)
CHLORIDE BLD-SCNC: 113 MEQ/L (ref 98–107)
CO2: 25 MEQ/L (ref 22–29)
CREAT SERPL-MCNC: 0.81 MG/DL (ref 0.5–0.9)
EOSINOPHILS ABSOLUTE: 0 K/UL (ref 0–0.7)
EOSINOPHILS RELATIVE PERCENT: 1 %
GFR AFRICAN AMERICAN: >60
GFR NON-AFRICAN AMERICAN: >60
GLUCOSE BLD-MCNC: 98 MG/DL (ref 74–109)
HCT VFR BLD CALC: 27.2 % (ref 37–47)
HEMOGLOBIN: 8.9 G/DL (ref 12–16)
HYPOCHROMIA: ABNORMAL
LYMPHOCYTES ABSOLUTE: 0.8 K/UL (ref 1–4.8)
LYMPHOCYTES RELATIVE PERCENT: 17 %
MACROCYTES: 0
MCH RBC QN AUTO: 29.4 PG (ref 27–31.3)
MCHC RBC AUTO-ENTMCNC: 32.7 % (ref 33–37)
MCV RBC AUTO: 90 FL (ref 82–100)
METAMYELOCYTES RELATIVE PERCENT: 4 %
MICROCYTES: 0
MONOCYTES ABSOLUTE: 0.2 K/UL (ref 0.2–0.8)
MONOCYTES RELATIVE PERCENT: 4.8 %
NEUTROPHILS ABSOLUTE: 3.4 K/UL (ref 1.4–6.5)
NEUTROPHILS RELATIVE PERCENT: 69 %
PDW BLD-RTO: 16.7 % (ref 11.5–14.5)
PLATELET # BLD: 139 K/UL (ref 130–400)
PLATELET SLIDE REVIEW: NORMAL
POIKILOCYTES: ABNORMAL
POLYCHROMASIA: 0
POTASSIUM SERPL-SCNC: 4.1 MEQ/L (ref 3.5–5.1)
RBC # BLD: 3.02 M/UL (ref 4.2–5.4)
SODIUM BLD-SCNC: 148 MEQ/L (ref 132–144)
TEAR DROP CELLS: ABNORMAL
WBC # BLD: 4.5 K/UL (ref 4.8–10.8)

## 2018-02-13 PROCEDURE — 99233 SBSQ HOSP IP/OBS HIGH 50: CPT | Performed by: INTERNAL MEDICINE

## 2018-02-13 PROCEDURE — APPSS15 APP SPLIT SHARED TIME 0-15 MINUTES: Performed by: PHYSICIAN ASSISTANT

## 2018-02-13 PROCEDURE — 71045 X-RAY EXAM CHEST 1 VIEW: CPT

## 2018-02-13 PROCEDURE — 94640 AIRWAY INHALATION TREATMENT: CPT

## 2018-02-13 PROCEDURE — 1210000000 HC MED SURG R&B

## 2018-02-13 PROCEDURE — 6370000000 HC RX 637 (ALT 250 FOR IP): Performed by: INTERNAL MEDICINE

## 2018-02-13 PROCEDURE — 36415 COLL VENOUS BLD VENIPUNCTURE: CPT

## 2018-02-13 PROCEDURE — 85025 COMPLETE CBC W/AUTO DIFF WBC: CPT

## 2018-02-13 PROCEDURE — 80048 BASIC METABOLIC PNL TOTAL CA: CPT

## 2018-02-13 PROCEDURE — 2580000003 HC RX 258: Performed by: INTERNAL MEDICINE

## 2018-02-13 PROCEDURE — 6360000002 HC RX W HCPCS: Performed by: INTERNAL MEDICINE

## 2018-02-13 RX ORDER — LEVOFLOXACIN 750 MG/1
750 TABLET ORAL
Qty: 3 TABLET | Refills: 0 | Status: SHIPPED | OUTPATIENT
Start: 2018-02-14 | End: 2018-02-24

## 2018-02-13 RX ORDER — LEVOFLOXACIN 750 MG/1
750 TABLET ORAL DAILY
Qty: 3 TABLET | Refills: 0 | Status: SHIPPED | OUTPATIENT
Start: 2018-02-14 | End: 2018-02-13 | Stop reason: HOSPADM

## 2018-02-13 RX ORDER — POTASSIUM CHLORIDE 750 MG/1
20 CAPSULE, EXTENDED RELEASE ORAL DAILY
Qty: 60 CAPSULE | Refills: 3 | Status: SHIPPED | OUTPATIENT
Start: 2018-02-13

## 2018-02-13 RX ADMIN — IPRATROPIUM BROMIDE AND ALBUTEROL SULFATE 1 AMPULE: .5; 3 SOLUTION RESPIRATORY (INHALATION) at 08:58

## 2018-02-13 RX ADMIN — LEVOFLOXACIN 750 MG: 500 TABLET, FILM COATED ORAL at 12:59

## 2018-02-13 RX ADMIN — IPRATROPIUM BROMIDE AND ALBUTEROL SULFATE 1 AMPULE: .5; 3 SOLUTION RESPIRATORY (INHALATION) at 15:19

## 2018-02-13 RX ADMIN — PAROXETINE HYDROCHLORIDE 15 MG: 30 TABLET, FILM COATED ORAL at 08:37

## 2018-02-13 RX ADMIN — ENOXAPARIN SODIUM 30 MG: 30 INJECTION SUBCUTANEOUS at 08:38

## 2018-02-13 RX ADMIN — IPRATROPIUM BROMIDE AND ALBUTEROL SULFATE 1 AMPULE: .5; 3 SOLUTION RESPIRATORY (INHALATION) at 19:02

## 2018-02-13 RX ADMIN — ASPIRIN 81 MG 81 MG: 81 TABLET ORAL at 08:38

## 2018-02-13 RX ADMIN — Medication 10 ML: at 08:38

## 2018-02-13 RX ADMIN — Medication 10 ML: at 21:44

## 2018-02-13 RX ADMIN — POTASSIUM CHLORIDE 40 MEQ: 20 TABLET, EXTENDED RELEASE ORAL at 08:38

## 2018-02-13 RX ADMIN — HYDRALAZINE HYDROCHLORIDE 50 MG: 50 TABLET, FILM COATED ORAL at 21:44

## 2018-02-13 RX ADMIN — RISPERIDONE 0.25 MG: 0.25 TABLET ORAL at 08:37

## 2018-02-13 RX ADMIN — HYDROCHLOROTHIAZIDE 12.5 MG: 12.5 CAPSULE ORAL at 08:38

## 2018-02-13 RX ADMIN — MELATONIN TAB 3 MG 5 MG: 3 TAB at 21:44

## 2018-02-13 RX ADMIN — HYDRALAZINE HYDROCHLORIDE 50 MG: 50 TABLET, FILM COATED ORAL at 08:37

## 2018-02-13 RX ADMIN — VITAMIN D, TAB 1000IU (100/BT) 2000 UNITS: 25 TAB at 08:38

## 2018-02-13 RX ADMIN — LEVOTHYROXINE SODIUM 150 MCG: 150 TABLET ORAL at 05:18

## 2018-02-13 RX ADMIN — DILTIAZEM HYDROCHLORIDE 240 MG: 240 CAPSULE, COATED, EXTENDED RELEASE ORAL at 08:37

## 2018-02-13 NOTE — PROGRESS NOTES
x-ray. There is also small area of atelectasis, infiltrate consolidation left lower lobe. There is a small pleural-based nodule at the superior medial aspect of the right upper lobe, image 9 series 3. It measures approximately 6 to 7 mm. No pneumothoraces. There is aneurysmal dilatation of the ascending arch the aorta measuring approximately 4.2 cm. No significant periaortic, pretracheal, parahilar adenopathy. The field-of-view the kidneys are partially visualized. The 2. Atrophic. Correlate with underlying medical renal function. There is less than a 2 mm nonobstructing calculi of the right kidney. Visualized osseous structures show multilevel degenerative change with bridging osteophytes. 1. THERE ARE BIBASILAR INFILTRATES CONSOLIDATIONS RIGHT GREATER THAN LEFT. THERE IS ASSOCIATED MODERATE RIGHT-SIDED PLEURAL EFFUSION WITH THE PLEURAL EFFUSION TRACKING TO THE FISSURE CAUSING APPEARANCE WITHIN THE CHEST X-RAY ON THE RIGHT. 2. THERE IS ANEURYSMAL DILATATION OF THE ASCENDING ARCH THE AORTA. THE KIDNEYS ARE PARTIALLY VISUALIZED. THEY DO APPEAR ATROPHIC. CORRELATE WITH UNDERLYING MEDICAL RENAL FUNCTION. THERE IS ALSO A LESS THAN 2 MM NONOBSTRUCTING RIGHT RENAL CALCULI. 4. THERE IS A 7 MM PLEURAL-BASED NODULE AT THE SUPERIOR LATERAL ASPECT OF THE RIGHT UPPER LOBE. WILL NEED FOLLOW-UP. All CT scans at this facility use dose modulation, iterative reconstruction, and/or weight based dosing when appropriate to reduce radiation dose to as low as reasonably achievable. Xr Chest Portable    Result Date: 2/11/2018  EXAMINATION: CHEST PORTABLE VIEW  CLINICAL HISTORY: Short of breath COMPARISONS: None  FINDINGS: Single  views of the chest is submitted. The cardiac silhouette is enlarged. .  The mediastinum is unremarkable. Pulmonary vascular unremarkable. Right sided trachea. There are bibasilar infiltrates consolidation with bilateral pleural effusions right greater than left. No Pneumothoraces. BIBASILAR INFILTRATES CONSOLIDATION WITH BILATERAL PLEURAL EFFUSIONS RIGHT GREATER THAN LEFT. Xr Chest Portable    Result Date: 2/6/2018  EXAMINATION: Portable AP ERECT view of the chest. CLINICAL HISTORY: Cough. COMPARISONS: 8/3/2017 FINDINGS: Normal cardiac silhouette. There are atherosclerotic calcifications in the aortic arch. The right costophrenic angle is blunted secondary to small pleural effusion. There is infiltrate/atelectasis at the right lung base. Left lung is clear. There are moderate degenerative changes in spine. Mild dextroscoliosis dorsal spine. SMALL RIGHT-SIDED PLEURAL EFFUSION. RIGHT BASILAR INFILTRATE/ATELECTASIS. ASSESSMENT /Plan:  1. Influenza A pneumonia, treated with Tamiflu  2. Parapneumonic pleural effusion  3. Advanced dementia  4. Chronic kidney disease  5. Chronic leukopenia    Patient was treated with Levaquin as well as Tamiflu. Patient has been resting comfortably in no acute distress and has had appropriate oxygenation. Family is requesting that they take her home today. Discussed case with Dr. Mahin Arambula and he would like to see a follow-up x-ray to assess the infiltrates and effusions last evaluated on February 11. We will order a portable chest x-ray in Dr. Mahin Arambula will be in later to talk with family and review imaging.      Electronically signed by Xiomy Bowie PA-C on 2/13/2018 at 11:45 AM

## 2018-02-13 NOTE — PROGRESS NOTES
CLINICAL PHARMACY: DISCHARGE MED RECONCILIATION/REVIEW    [facility] [place] [facility] Select Patient?: Yes  Total # of Interventions Recommended: 1 -   - Decreased Dose #: 1   -   - Decreased Dose #: 1  Total # Interventions Accepted: 1  Time Spent (min): 15  - Patient ordered levaquin Q24H on discharge, per Cr[facility] of 30 recommend adjusting to Q48H dosing on discharge - recommendation accepted by hospitalist    Additional Documentation:    Pharmacy Patient Education - Medication Counseling    Admitting Diagnosis: Influenza [J11.1]   Counseled patient on new medications started since admission utilizing teach-back method. Reviewed indication, directions, possible major drug-drug and drug-food interactions, most common side effects, and length of therapy. Please check boxes as appropriate:           Y   Reviewed patients home medication list            N   Printed patient medication education handouts from [facility] or [facility]          Y   Patient voiced their ability to  their medications from a pharmacy          Y   Patient voiced understanding and any concerns          Y   Patient understands the importance of medication adherence            Y   Patient instructed to call the pharmacy message line at [phone] ([facility]) if    any additional pharmacy questions arise (response time is within 24    hours)      Additional Notes/Follow Up:    Reviewed medications with patient and son at bedside. Pt was not very alert or responsive during the conversation, and discussed everything primarily with the son  Augustinena Patient's son does not handle her medications, her daughter handles them, because of this he stated he will discuss everything we talked about with her as well   Discussed antibiotic therapy on discharge and importance of finishing the full course   Addressed all questions and concerns he had at this time    Thank you,  Alin Peters, PharmD  PGY-1 Pharmacy Resident  2/13/2018 1:56 PM

## 2018-02-13 NOTE — DISCHARGE SUMMARY
deformity    Heart:    Regular rate and rhythm, S1 and S2 normal, no murmur, rub   or gallop   Breast Exam:    No tenderness, masses, or nipple abnormality   Abdomen:     Soft, non-tender, bowel sounds active all four quadrants,     no masses, no organomegaly   Genitalia:    Normal female without lesion, discharge or tenderness   Rectal:    Normal tone ;guaiac negative stool   Extremities:   Extremities normal, atraumatic, no cyanosis or edema   Pulses:   2+ and symmetric all extremities   Skin:   Skin color, texture, turgor normal, no rashes or lesions   Lymph nodes:   Cervical, supraclavicular, and axillary nodes normal   Neurologic:   CNII-XII intact, normal strength, sensation and reflexes     throughout         Disposition: home    In process/preliminary results:Outstanding Order Results     No orders found from 1/8/2018 to 2/7/2018.           Patient Instructions:   Current Discharge Medication List      START taking these medications    Details   levofloxacin (LEVAQUIN) 750 MG tablet Take 1 tablet by mouth daily for 3 doses  Qty: 3 tablet, Refills: 0         CONTINUE these medications which have CHANGED    Details   potassium chloride (MICRO-K) 10 MEQ extended release capsule Take 2 capsules by mouth daily  Qty: 60 capsule, Refills: 3         CONTINUE these medications which have NOT CHANGED    Details   risperiDONE (RISPERDAL) 0.25 MG tablet Take 0.25 mg by mouth daily      traZODone (DESYREL) 50 MG tablet Take 50 mg by mouth nightly      hydrALAZINE (APRESOLINE) 50 MG tablet Take 1 tablet by mouth 2 times daily  Qty: 90 tablet, Refills: 3      diltiazem (CARDIZEM CD) 240 MG extended release capsule Take 240 mg by mouth daily       PARoxetine (PAXIL) 10 MG tablet Take 15 mg by mouth every morning      levothyroxine (SYNTHROID) 150 MCG tablet Take 150 mcg by mouth Daily      melatonin 3 MG TABS tablet Take 3 mg by mouth daily       Cholecalciferol (VITAMIN D) 2000 units CAPS capsule Take 1 capsule by mouth

## 2018-02-13 NOTE — HOME CARE
SPOKE WITH [facility] HOME CARE OFFICE. CONFIRMATION OF PRIOR SERVICES MADE, LAST SEEN ON 1/24 BY A CNP. PATIENT HAS [facility] PHYSICIAN THAT VISITS. HOME CARE SERVICES TERMINATED IN January. INFORMATION FORWARDED TO THE OFFICE TO TRY AND REOPEN THE CASE. WILL WAIT TO HEAR FROM THEM.   Electronically signed by Elton Ramon RN on 2/13/2018 at 1:58 PM
gait training, balance training, safety training and functional mobility  - Consult Occupational Therapy for  range of motion, strengthening training, Activities of Daily Living retraining, Independent Activities of Daily Living retraining and adaptive equipment training due to weakness and recent loss of function  - Consult Speech Language Pathology for cognitive evaluation, swallowing assessment, communication deficit due to neurologic condition and Evaluate and Treat  - [address] [place] for  assistance with Activities of Daily Living       Diet:  DIET DYSPHAGIA I PUREED;

## 2018-02-14 VITALS
HEART RATE: 105 BPM | TEMPERATURE: 97.7 F | OXYGEN SATURATION: 96 % | SYSTOLIC BLOOD PRESSURE: 134 MMHG | HEIGHT: 61 IN | WEIGHT: 120.15 LBS | BODY MASS INDEX: 22.68 KG/M2 | DIASTOLIC BLOOD PRESSURE: 66 MMHG | RESPIRATION RATE: 16 BRPM

## 2018-02-14 LAB
ANION GAP SERPL CALCULATED.3IONS-SCNC: 10 MEQ/L (ref 7–13)
BUN BLDV-MCNC: 20 MG/DL (ref 8–23)
CALCIUM SERPL-MCNC: 8.5 MG/DL (ref 8.6–10.2)
CHLORIDE BLD-SCNC: 111 MEQ/L (ref 98–107)
CO2: 26 MEQ/L (ref 22–29)
CREAT SERPL-MCNC: 0.99 MG/DL (ref 0.5–0.9)
GFR AFRICAN AMERICAN: >60
GFR NON-AFRICAN AMERICAN: 51.9
GLUCOSE BLD-MCNC: 107 MG/DL (ref 74–109)
MAGNESIUM: 1.8 MG/DL (ref 1.7–2.3)
POTASSIUM SERPL-SCNC: 4.2 MEQ/L (ref 3.5–5.1)
SODIUM BLD-SCNC: 147 MEQ/L (ref 132–144)

## 2018-02-14 PROCEDURE — 80048 BASIC METABOLIC PNL TOTAL CA: CPT

## 2018-02-14 PROCEDURE — 2580000003 HC RX 258: Performed by: INTERNAL MEDICINE

## 2018-02-14 PROCEDURE — 6360000002 HC RX W HCPCS: Performed by: INTERNAL MEDICINE

## 2018-02-14 PROCEDURE — 6370000000 HC RX 637 (ALT 250 FOR IP): Performed by: INTERNAL MEDICINE

## 2018-02-14 PROCEDURE — 94640 AIRWAY INHALATION TREATMENT: CPT

## 2018-02-14 PROCEDURE — APPSS15 APP SPLIT SHARED TIME 0-15 MINUTES: Performed by: PHYSICIAN ASSISTANT

## 2018-02-14 PROCEDURE — 99232 SBSQ HOSP IP/OBS MODERATE 35: CPT | Performed by: INTERNAL MEDICINE

## 2018-02-14 PROCEDURE — 94760 N-INVAS EAR/PLS OXIMETRY 1: CPT

## 2018-02-14 PROCEDURE — 36415 COLL VENOUS BLD VENIPUNCTURE: CPT

## 2018-02-14 PROCEDURE — 83735 ASSAY OF MAGNESIUM: CPT

## 2018-02-14 RX ADMIN — HYDROCHLOROTHIAZIDE 12.5 MG: 12.5 CAPSULE ORAL at 10:25

## 2018-02-14 RX ADMIN — ENOXAPARIN SODIUM 30 MG: 30 INJECTION SUBCUTANEOUS at 10:26

## 2018-02-14 RX ADMIN — HYDRALAZINE HYDROCHLORIDE 50 MG: 50 TABLET, FILM COATED ORAL at 10:25

## 2018-02-14 RX ADMIN — VITAMIN D, TAB 1000IU (100/BT) 2000 UNITS: 25 TAB at 10:25

## 2018-02-14 RX ADMIN — IPRATROPIUM BROMIDE AND ALBUTEROL SULFATE 1 AMPULE: .5; 3 SOLUTION RESPIRATORY (INHALATION) at 15:11

## 2018-02-14 RX ADMIN — Medication 10 ML: at 10:40

## 2018-02-14 RX ADMIN — RISPERIDONE 0.25 MG: 0.25 TABLET ORAL at 10:25

## 2018-02-14 RX ADMIN — IPRATROPIUM BROMIDE AND ALBUTEROL SULFATE 1 AMPULE: .5; 3 SOLUTION RESPIRATORY (INHALATION) at 05:03

## 2018-02-14 RX ADMIN — PAROXETINE HYDROCHLORIDE 15 MG: 30 TABLET, FILM COATED ORAL at 10:25

## 2018-02-14 RX ADMIN — LEVOTHYROXINE SODIUM 150 MCG: 150 TABLET ORAL at 06:39

## 2018-02-14 RX ADMIN — DILTIAZEM HYDROCHLORIDE 240 MG: 240 CAPSULE, COATED, EXTENDED RELEASE ORAL at 10:25

## 2018-02-14 RX ADMIN — ASPIRIN 81 MG 81 MG: 81 TABLET ORAL at 10:25

## 2018-02-14 NOTE — CARE COORDINATION
Message left for NICKO WOODARD, regarding if pt's case is an APS referral at discharge. Discharge plan remains home at this time. Electronically signed by RUDY Maya on 2/14/18 at 12:04 PM    Per NICKO WOODARD, if pt discharges home today, APS referral is to be made by LISAW.  Electronically signed by RUDY Maya on 2/14/18 at 12:06 PM

## 2018-02-14 NOTE — PROGRESS NOTES
Notified hospitalist pt had a 5 beat run of vtach, no new orders at this time. [id number] notified hospitalist pt has a 12 beat run of vtach, no new orders at this time.

## 2018-02-15 ENCOUNTER — CARE COORDINATION (OUTPATIENT)
Dept: CASE MANAGEMENT | Age: 83
End: 2018-02-15

## 2018-02-15 NOTE — FLOWSHEET NOTE
Discharge instruction given to patient's care giver. Patient was changed and cleaned up before leaving the hospital. Patient waiting for life care.

## 2018-03-02 ENCOUNTER — TELEPHONE (OUTPATIENT)
Dept: PHARMACY | Facility: CLINIC | Age: 83
End: 2018-03-02

## 2018-03-02 DIAGNOSIS — Z71.89 ENCOUNTER FOR MEDICATION REVIEW AND COUNSELING: Primary | ICD-10-CM

## 2018-03-02 PROCEDURE — 1111F DSCHRG MED/CURRENT MED MERGE: CPT | Performed by: PHARMACIST

## 2018-03-05 NOTE — TELEPHONE ENCOUNTER
CLINICAL PHARMACY NOTE  Post-Discharge Transitions of Care ([facility])    Non-face-to-face services provided:  Assessment and support for treatment adherence and medication management-med rec completed    Subjective/Objective:  Estelle Kaiser is a 80 y.o. female. Patient was discharged from [facility] on 2/14/2018 with a diagnosis of influenza with pneumonia. Patient outreach to review discharge medications and provide medication review and management. Spoke with daughter Susan Onofre who manages pt's medications. PCP is Rosemarie Patel ([facility]). Has not seen since being home but has seen a nurse who comes to the home. Per review, pt followed by mobile physicians ([facility]     Allergies   Allergen Reactions    Erythromycin     Tetracyclines & Related        Discharge Medications (as per discharging medication list found in AVS):  START taking these medications     Details   levofloxacin (LEVAQUIN) 750 MG tablet Take 1 tablet by mouth daily for 3 doses - confirms finished  Qty: 3 tablet, Refills: 0               CONTINUE these medications which have CHANGED     Details   potassium chloride (MICRO-K) 10 MEQ extended release capsule Take 2 capsules by mouth daily  Qty: 60 capsule, Refills: 3  - no change, previous Rx written as 20 mEq daily               CONTINUE these medications which have NOT CHANGED     Details   risperiDONE (RISPERDAL) 0.25 MG tablet Take 0.25 mg by mouth daily       traZODone (DESYREL) 50 MG tablet Take 50 mg by mouth nightly - takes 1/2 daily       hydrALAZINE (APRESOLINE) 50 MG tablet Take 1 tablet by mouth 2 times daily  Qty: 90 tablet, Refills: 3       diltiazem (CARDIZEM CD) 240 MG extended release capsule Take 240 mg by mouth daily        PARoxetine (PAXIL) 10 MG tablet Take 15 mg by mouth every morning       levothyroxine (SYNTHROID) 150 MCG tablet Take 150 mcg by mouth Daily       melatonin 3 MG TABS tablet Take 3 mg by mouth daily        Cholecalciferol (VITAMIN

## 2018-04-12 PROBLEM — N39.0 UTI (URINARY TRACT INFECTION): Status: RESOLVED | Noted: 2017-11-24 | Resolved: 2018-04-12

## 2018-11-09 ENCOUNTER — TELEPHONE (OUTPATIENT)
Dept: PHARMACY | Facility: CLINIC | Age: 83
End: 2018-11-09

## 2018-11-09 NOTE — TELEPHONE ENCOUNTER
CLINICAL PHARMACY NOTE - Adherence Review    Identified care gap per [employer] (patient insurance): Losartan 100mg adherence  Per records, appears 30-day supply last filled 7/4/18    Notes:   Per Reconcile Medication Dispenses in Care Path: No updated information  Per Luis Waite at [address]: last filled on 7/4/18 for a 30-day supply; billed thru insurance. No active prescriptions are on file. Attempting to reach patient to review. Left message asking for return call to [phone]. Will continue to attempt to reach as appropriate. Damien Hidalgo  PharmD Candidate [id number] [phone]    I have discussed the care of this patient with the student. I agree with the assessment and plan as documented.      Dimas Garcia, PharmD, [facility], [address]  Clinical Pharmacy Specialist  O: [phone]  C: [id number], [address]

## 2019-02-28 ENCOUNTER — TELEPHONE (OUTPATIENT)
Dept: PHARMACY | Facility: CLINIC | Age: 84
End: 2019-02-28

## 2025-06-11 NOTE — PROGRESS NOTES
INPATIENT PROGRESS NOTES    PATIENT NAME: Julia Douglas  MRN: [id number]  SERVICE DATE:  February 14, 2018   SERVICE TIME:  4:05 PM      PRIMARY SERVICE:  Pulmonary Medicine     INTERVAL HPI: Patient seen and examined at bedside, Interval Notes, orders reviewed. Nursing notes noted: Patient is resting comfortably and appears in no acute distress. Patient's daughter is here today who has some questions about the fluid in her lungs. Prior family members did not want to proceed with thoracentesis and this daughter reports that this is something she may want to think about. Procedure was discussed at great length with prior family members including patient's son about possible thoracentesis on right side which he declined however his sister has some further questions regarding this. Patient has remained afebrile without chills. CXR was completed that showed no change in CXR. Effusions present R>L    Review of Systems  Please see HPI above. OBJECTIVE    Body mass index is 22.7 kg/m². PHYSICAL EXAM:  Vitals:  /66   Pulse 105   Temp 97.7 °F (36.5 °C) (Oral)   Resp 16   Ht 5' 1\" (1.549 m)   Wt 120 lb 2.4 oz (54.5 kg)   SpO2 96%   BMI 22.70 kg/m²   General: Patient is comfortable in bed, No distress. Head: Atraumatic , Normocephalic   Eyes: PERRL. No sclera icterus. No conjunctival injection. No discharge   ENT: No nasal  discharge. Pharynx clear. Neck:  Trachea midline. No thyromegaly, no JVD, No cervical adenopathy. Resp : Diminished breath sounds bilaterally with few scattered rhonchi at the bases.  No wheezing noted.  No accessory muscle use  CV: Normal  rate. Regular rhythm. No mumur ,  Rub or gallop  ABD: Non-tender. Non-distended. No masses. No organmegaly. Normal bowel sounds. No hernia. EXT: No Pitting, No Cyanosis No clubbing  Neuro: no focal weakness  Skin: Warm and dry.   Skin breakdown and erythema on buttocks area, this was present on admission and wound care is now following           DATA:   Recent Labs      02/13/18   1025   WBC  4.5*   HGB  8.9*   HCT  27.2*   MCV  90.0   PLT  139     Recent Labs      02/13/18   1025  02/14/18   1126   NA  148*  147*   K  4.1  4.2   CL  113*  111*   CO2  25  26   BUN  18  20   CREATININE  0.81  0.99*   GLUCOSE  98  107   CALCIUM  8.0*  8.5*   LABGLOM  >60.0  51.9*   GFRAA  >60.0  >60.0         No results for input(s): PHART, XNG4AFF, PO2ART, VCV7MIN, BEART, V6FHBRDK in the last 72 hours. O2 Device: None (Room air)  Lab Results   Component Value Date    LACTA 1.1 02/07/2018        MEDICATIONS during current hospitalization:    Continuous Infusions:     Scheduled Meds:   ipratropium-albuterol  1 ampule Inhalation TID    melatonin  5 mg Oral Daily    levofloxacin  750 mg Oral Q48H    sodium chloride flush  10 mL Intravenous 2 times per day    enoxaparin  30 mg Subcutaneous Daily    aspirin  81 mg Oral Daily    vitamin D  2,000 Units Oral Daily    diltiazem  240 mg Oral Daily    hydrALAZINE  50 mg Oral BID    hydrochlorothiazide  12.5 mg Oral Daily    levothyroxine  150 mcg Oral Daily    PARoxetine  15 mg Oral QAM    risperiDONE  0.25 mg Oral Daily       PRN Meds:sodium chloride flush, acetaminophen, magnesium hydroxide, ondansetron, albuterol    Radiology  Ct Chest Wo Contrast    Result Date: 2/6/2018  EXAMINATION:  CT SCAN THE CHEST CLINICAL HISTORY:  Abnormal chest x-ray COMPARISON: TECHNIQUE:  Multiple serial axial images from the base neck through the upper abdomen with both sagittal coronal reconstruction was performed without intravenous or oral administration of contrast. FINDINGS:  There is an area of infiltrate consolidation air bronchograms of the right lower lobe. There is associated moderate effusion which extends into the fissure and account for the appearance on the chest x-ray. There is also small area of atelectasis, infiltrate consolidation left lower lobe.  There is a small pleural-based nodule at the superior details…